# Patient Record
Sex: FEMALE | Race: WHITE | NOT HISPANIC OR LATINO
[De-identification: names, ages, dates, MRNs, and addresses within clinical notes are randomized per-mention and may not be internally consistent; named-entity substitution may affect disease eponyms.]

---

## 2021-04-13 ENCOUNTER — APPOINTMENT (OUTPATIENT)
Dept: ORTHOPEDIC SURGERY | Facility: CLINIC | Age: 51
End: 2021-04-13
Payer: COMMERCIAL

## 2021-04-13 DIAGNOSIS — M75.81 OTHER SHOULDER LESIONS, RIGHT SHOULDER: ICD-10-CM

## 2021-04-13 DIAGNOSIS — M25.511 PAIN IN RIGHT SHOULDER: ICD-10-CM

## 2021-04-13 PROCEDURE — 73030 X-RAY EXAM OF SHOULDER: CPT | Mod: RT

## 2021-04-13 PROCEDURE — 99203 OFFICE O/P NEW LOW 30 MIN: CPT

## 2021-04-13 PROCEDURE — 99072 ADDL SUPL MATRL&STAF TM PHE: CPT

## 2021-04-13 RX ORDER — DIPHENHYDRAMINE HCL, IBUPROFEN 25; 200 MG/1; MG/1
200-25 CAPSULE, LIQUID FILLED ORAL
Refills: 0 | Status: ACTIVE | COMMUNITY

## 2021-04-13 NOTE — ADDENDUM
[FreeTextEntry1] : I, Pawan Arriaza wrote this note acting as a scribe for Dr. Nick Godoy on Apr 13, 2021.

## 2021-04-13 NOTE — HISTORY OF PRESENT ILLNESS
[de-identified] : PIO TRAN is a 50 year female who presents for initial evaluation of right shoulder pain. Patient threw a toy a few weeks ago which caused immediate pain. Now pain is present with range of motion. Patient states pain is worse at night. There is no numbness present. Patient takes Advil prn for pain.

## 2021-04-13 NOTE — END OF VISIT
[FreeTextEntry3] : All medical record entries made by the Scribe were at my,  Dr. Nick Godoy MD., direction and personally dictated by me on 04/13/2021. I have personally reviewed the chart and agree that the record accurately reflects my personal performance of the history, physical exam, assessment and plan.\par \par \par

## 2021-04-13 NOTE — PHYSICAL EXAM
[de-identified] : Patient is WDWN, alert, and in no acute distress. Breathing is unlabored. She is grossly oriented to person, place, and time\par \par Right Shoulder:\par   Inspection/ Palpation: there is moderate deltoid tenderness, no discoloration, swelling, or deformities\par . Range of Motion: active flexion, passive flexion, abduction, external rotation is full but with pain\par Strength: forward elevation, internal rotation, external rotation, adduction, and abduction are 5/5. \par  Stability: no joint instability on provocative testing.  [de-identified] : AP, transcapula, and axillary views of the [] shoulder were obtained and revealed no abnormalities. No acute fracture. No dislocation. Cartilage spaces are maintained.\par

## 2021-04-13 NOTE — DISCUSSION/SUMMARY
[de-identified] : The underlying pathophysiology was reviewed with the patient. XR films were reviewed with the patient. Discussed at length the nature of the patient’s condition. 		\par \par Treatment options were discussed including; observation, NSAIDS, analgesics, bracing, injection(s) and physical therapy.\par \par The patient wishes to proceed with physical therapy for right shoulder. A script was given.\par \par Advised use of heating pad on shoulder.		\par Advil or Aleve prn\par Advised gentle ROM and to keep shoulder active\par \par Patient can continue activities as tolerated. All questions answered, understanding verbalized. Patient in agreement with plan of care. Patient may follow up as needed.\par

## 2021-10-27 ENCOUNTER — FORM ENCOUNTER (OUTPATIENT)
Age: 51
End: 2021-10-27

## 2021-10-28 ENCOUNTER — TRANSCRIPTION ENCOUNTER (OUTPATIENT)
Age: 51
End: 2021-10-28

## 2022-04-02 ENCOUNTER — TRANSCRIPTION ENCOUNTER (OUTPATIENT)
Age: 52
End: 2022-04-02

## 2022-04-06 ENCOUNTER — TRANSCRIPTION ENCOUNTER (OUTPATIENT)
Age: 52
End: 2022-04-06

## 2022-06-08 ENCOUNTER — NON-APPOINTMENT (OUTPATIENT)
Age: 52
End: 2022-06-08

## 2022-06-09 ENCOUNTER — INPATIENT (INPATIENT)
Facility: HOSPITAL | Age: 52
LOS: 2 days | Discharge: HOME | DRG: 282 | End: 2022-06-12
Attending: STUDENT IN AN ORGANIZED HEALTH CARE EDUCATION/TRAINING PROGRAM | Admitting: STUDENT IN AN ORGANIZED HEALTH CARE EDUCATION/TRAINING PROGRAM
Payer: COMMERCIAL

## 2022-06-09 ENCOUNTER — EMERGENCY (EMERGENCY)
Facility: HOSPITAL | Age: 52
LOS: 1 days | Discharge: ANOTHER HOSPITAL | End: 2022-06-09
Attending: EMERGENCY MEDICINE | Admitting: EMERGENCY MEDICINE
Payer: COMMERCIAL

## 2022-06-09 VITALS
HEART RATE: 69 BPM | TEMPERATURE: 98 F | SYSTOLIC BLOOD PRESSURE: 108 MMHG | OXYGEN SATURATION: 98 % | RESPIRATION RATE: 18 BRPM | DIASTOLIC BLOOD PRESSURE: 87 MMHG

## 2022-06-09 VITALS
RESPIRATION RATE: 18 BRPM | OXYGEN SATURATION: 97 % | WEIGHT: 153 LBS | HEART RATE: 81 BPM | DIASTOLIC BLOOD PRESSURE: 78 MMHG | TEMPERATURE: 98 F | HEIGHT: 64.5 IN | SYSTOLIC BLOOD PRESSURE: 128 MMHG

## 2022-06-09 VITALS
OXYGEN SATURATION: 100 % | HEART RATE: 71 BPM | RESPIRATION RATE: 19 BRPM | WEIGHT: 154.1 LBS | SYSTOLIC BLOOD PRESSURE: 147 MMHG | DIASTOLIC BLOOD PRESSURE: 94 MMHG | TEMPERATURE: 98 F

## 2022-06-09 DIAGNOSIS — Z29.9 ENCOUNTER FOR PROPHYLACTIC MEASURES, UNSPECIFIED: ICD-10-CM

## 2022-06-09 DIAGNOSIS — R07.9 CHEST PAIN, UNSPECIFIED: ICD-10-CM

## 2022-06-09 DIAGNOSIS — I21.4 NON-ST ELEVATION (NSTEMI) MYOCARDIAL INFARCTION: ICD-10-CM

## 2022-06-09 LAB
ALBUMIN SERPL ELPH-MCNC: 3.7 G/DL — SIGNIFICANT CHANGE UP (ref 3.3–5)
ALBUMIN SERPL ELPH-MCNC: 4 G/DL — SIGNIFICANT CHANGE UP (ref 3.3–5)
ALP SERPL-CCNC: 46 U/L — SIGNIFICANT CHANGE UP (ref 40–120)
ALP SERPL-CCNC: 51 U/L — SIGNIFICANT CHANGE UP (ref 40–120)
ALT FLD-CCNC: 13 U/L — SIGNIFICANT CHANGE UP (ref 10–45)
ALT FLD-CCNC: 18 U/L — SIGNIFICANT CHANGE UP (ref 10–45)
ANION GAP SERPL CALC-SCNC: 10 MMOL/L — SIGNIFICANT CHANGE UP (ref 5–17)
ANION GAP SERPL CALC-SCNC: 7 MMOL/L — SIGNIFICANT CHANGE UP (ref 5–17)
APTT BLD: 28.5 SEC — SIGNIFICANT CHANGE UP (ref 27.5–35.5)
APTT BLD: 64.3 SEC — HIGH (ref 27.5–35.5)
APTT BLD: 99.8 SEC — HIGH (ref 27.5–35.5)
AST SERPL-CCNC: 33 U/L — SIGNIFICANT CHANGE UP (ref 10–40)
AST SERPL-CCNC: 40 U/L — SIGNIFICANT CHANGE UP (ref 10–40)
BASOPHILS # BLD AUTO: 0.05 K/UL — SIGNIFICANT CHANGE UP (ref 0–0.2)
BASOPHILS # BLD AUTO: 0.05 K/UL — SIGNIFICANT CHANGE UP (ref 0–0.2)
BASOPHILS # BLD AUTO: 0.07 K/UL — SIGNIFICANT CHANGE UP (ref 0–0.2)
BASOPHILS NFR BLD AUTO: 0.5 % — SIGNIFICANT CHANGE UP (ref 0–2)
BASOPHILS NFR BLD AUTO: 0.5 % — SIGNIFICANT CHANGE UP (ref 0–2)
BASOPHILS NFR BLD AUTO: 0.7 % — SIGNIFICANT CHANGE UP (ref 0–2)
BILIRUB SERPL-MCNC: 0.2 MG/DL — SIGNIFICANT CHANGE UP (ref 0.2–1.2)
BILIRUB SERPL-MCNC: 0.3 MG/DL — SIGNIFICANT CHANGE UP (ref 0.2–1.2)
BUN SERPL-MCNC: 12 MG/DL — SIGNIFICANT CHANGE UP (ref 7–23)
BUN SERPL-MCNC: 14 MG/DL — SIGNIFICANT CHANGE UP (ref 7–23)
CALCIUM SERPL-MCNC: 8.8 MG/DL — SIGNIFICANT CHANGE UP (ref 8.4–10.5)
CALCIUM SERPL-MCNC: 9.1 MG/DL — SIGNIFICANT CHANGE UP (ref 8.4–10.5)
CHLORIDE SERPL-SCNC: 106 MMOL/L — SIGNIFICANT CHANGE UP (ref 96–108)
CHLORIDE SERPL-SCNC: 107 MMOL/L — SIGNIFICANT CHANGE UP (ref 96–108)
CK MB CFR SERPL CALC: 20.2 NG/ML — HIGH (ref 0–3.8)
CO2 SERPL-SCNC: 24 MMOL/L — SIGNIFICANT CHANGE UP (ref 22–31)
CO2 SERPL-SCNC: 29 MMOL/L — SIGNIFICANT CHANGE UP (ref 22–31)
CREAT SERPL-MCNC: 0.65 MG/DL — SIGNIFICANT CHANGE UP (ref 0.5–1.3)
CREAT SERPL-MCNC: 0.7 MG/DL — SIGNIFICANT CHANGE UP (ref 0.5–1.3)
EGFR: 104 ML/MIN/1.73M2 — SIGNIFICANT CHANGE UP
EGFR: 106 ML/MIN/1.73M2 — SIGNIFICANT CHANGE UP
EOSINOPHIL # BLD AUTO: 0.11 K/UL — SIGNIFICANT CHANGE UP (ref 0–0.5)
EOSINOPHIL # BLD AUTO: 0.17 K/UL — SIGNIFICANT CHANGE UP (ref 0–0.5)
EOSINOPHIL # BLD AUTO: 0.17 K/UL — SIGNIFICANT CHANGE UP (ref 0–0.5)
EOSINOPHIL NFR BLD AUTO: 1.2 % — SIGNIFICANT CHANGE UP (ref 0–6)
EOSINOPHIL NFR BLD AUTO: 1.6 % — SIGNIFICANT CHANGE UP (ref 0–6)
EOSINOPHIL NFR BLD AUTO: 1.9 % — SIGNIFICANT CHANGE UP (ref 0–6)
GLUCOSE SERPL-MCNC: 93 MG/DL — SIGNIFICANT CHANGE UP (ref 70–99)
GLUCOSE SERPL-MCNC: 97 MG/DL — SIGNIFICANT CHANGE UP (ref 70–99)
HCT VFR BLD CALC: 35.1 % — SIGNIFICANT CHANGE UP (ref 34.5–45)
HCT VFR BLD CALC: 36.3 % — SIGNIFICANT CHANGE UP (ref 34.5–45)
HCT VFR BLD CALC: 36.7 % — SIGNIFICANT CHANGE UP (ref 34.5–45)
HGB BLD-MCNC: 11.7 G/DL — SIGNIFICANT CHANGE UP (ref 11.5–15.5)
HGB BLD-MCNC: 11.9 G/DL — SIGNIFICANT CHANGE UP (ref 11.5–15.5)
HGB BLD-MCNC: 12.5 G/DL — SIGNIFICANT CHANGE UP (ref 11.5–15.5)
IMM GRANULOCYTES NFR BLD AUTO: 0.2 % — SIGNIFICANT CHANGE UP (ref 0–1.5)
IMM GRANULOCYTES NFR BLD AUTO: 0.3 % — SIGNIFICANT CHANGE UP (ref 0–1.5)
IMM GRANULOCYTES NFR BLD AUTO: 0.3 % — SIGNIFICANT CHANGE UP (ref 0–1.5)
INR BLD: 1.2 RATIO — HIGH (ref 0.88–1.16)
INR BLD: 1.22 RATIO — HIGH (ref 0.88–1.16)
INR BLD: 1.27 RATIO — HIGH (ref 0.88–1.16)
LYMPHOCYTES # BLD AUTO: 2.25 K/UL — SIGNIFICANT CHANGE UP (ref 1–3.3)
LYMPHOCYTES # BLD AUTO: 2.6 K/UL — SIGNIFICANT CHANGE UP (ref 1–3.3)
LYMPHOCYTES # BLD AUTO: 23.7 % — SIGNIFICANT CHANGE UP (ref 13–44)
LYMPHOCYTES # BLD AUTO: 28.4 % — SIGNIFICANT CHANGE UP (ref 13–44)
LYMPHOCYTES # BLD AUTO: 3.57 K/UL — HIGH (ref 1–3.3)
LYMPHOCYTES # BLD AUTO: 33.6 % — SIGNIFICANT CHANGE UP (ref 13–44)
MCHC RBC-ENTMCNC: 29 PG — SIGNIFICANT CHANGE UP (ref 27–34)
MCHC RBC-ENTMCNC: 30.4 PG — SIGNIFICANT CHANGE UP (ref 27–34)
MCHC RBC-ENTMCNC: 30.5 PG — SIGNIFICANT CHANGE UP (ref 27–34)
MCHC RBC-ENTMCNC: 32.2 GM/DL — SIGNIFICANT CHANGE UP (ref 32–36)
MCHC RBC-ENTMCNC: 33.9 GM/DL — SIGNIFICANT CHANGE UP (ref 32–36)
MCHC RBC-ENTMCNC: 34.1 GM/DL — SIGNIFICANT CHANGE UP (ref 32–36)
MCV RBC AUTO: 89.5 FL — SIGNIFICANT CHANGE UP (ref 80–100)
MCV RBC AUTO: 89.5 FL — SIGNIFICANT CHANGE UP (ref 80–100)
MCV RBC AUTO: 89.9 FL — SIGNIFICANT CHANGE UP (ref 80–100)
MONOCYTES # BLD AUTO: 0.6 K/UL — SIGNIFICANT CHANGE UP (ref 0–0.9)
MONOCYTES # BLD AUTO: 0.62 K/UL — SIGNIFICANT CHANGE UP (ref 0–0.9)
MONOCYTES # BLD AUTO: 0.62 K/UL — SIGNIFICANT CHANGE UP (ref 0–0.9)
MONOCYTES NFR BLD AUTO: 5.8 % — SIGNIFICANT CHANGE UP (ref 2–14)
MONOCYTES NFR BLD AUTO: 6.3 % — SIGNIFICANT CHANGE UP (ref 2–14)
MONOCYTES NFR BLD AUTO: 6.8 % — SIGNIFICANT CHANGE UP (ref 2–14)
NEUTROPHILS # BLD AUTO: 5.71 K/UL — SIGNIFICANT CHANGE UP (ref 1.8–7.4)
NEUTROPHILS # BLD AUTO: 6.18 K/UL — SIGNIFICANT CHANGE UP (ref 1.8–7.4)
NEUTROPHILS # BLD AUTO: 6.45 K/UL — SIGNIFICANT CHANGE UP (ref 1.8–7.4)
NEUTROPHILS NFR BLD AUTO: 58 % — SIGNIFICANT CHANGE UP (ref 43–77)
NEUTROPHILS NFR BLD AUTO: 62.2 % — SIGNIFICANT CHANGE UP (ref 43–77)
NEUTROPHILS NFR BLD AUTO: 68 % — SIGNIFICANT CHANGE UP (ref 43–77)
NRBC # BLD: 0 /100 WBCS — SIGNIFICANT CHANGE UP (ref 0–0)
NT-PROBNP SERPL-SCNC: 198 PG/ML — SIGNIFICANT CHANGE UP (ref 0–300)
PLATELET # BLD AUTO: 227 K/UL — SIGNIFICANT CHANGE UP (ref 150–400)
PLATELET # BLD AUTO: 244 K/UL — SIGNIFICANT CHANGE UP (ref 150–400)
PLATELET # BLD AUTO: 245 K/UL — SIGNIFICANT CHANGE UP (ref 150–400)
POTASSIUM SERPL-MCNC: 3.3 MMOL/L — LOW (ref 3.5–5.3)
POTASSIUM SERPL-MCNC: 3.5 MMOL/L — SIGNIFICANT CHANGE UP (ref 3.5–5.3)
POTASSIUM SERPL-SCNC: 3.3 MMOL/L — LOW (ref 3.5–5.3)
POTASSIUM SERPL-SCNC: 3.5 MMOL/L — SIGNIFICANT CHANGE UP (ref 3.5–5.3)
PROT SERPL-MCNC: 6.7 G/DL — SIGNIFICANT CHANGE UP (ref 6–8.3)
PROT SERPL-MCNC: 7.2 G/DL — SIGNIFICANT CHANGE UP (ref 6–8.3)
PROTHROM AB SERPL-ACNC: 13.9 SEC — HIGH (ref 10.5–13.4)
PROTHROM AB SERPL-ACNC: 14.2 SEC — HIGH (ref 10.5–13.4)
PROTHROM AB SERPL-ACNC: 14.8 SEC — HIGH (ref 10.5–13.4)
RBC # BLD: 3.92 M/UL — SIGNIFICANT CHANGE UP (ref 3.8–5.2)
RBC # BLD: 4.04 M/UL — SIGNIFICANT CHANGE UP (ref 3.8–5.2)
RBC # BLD: 4.1 M/UL — SIGNIFICANT CHANGE UP (ref 3.8–5.2)
RBC # FLD: 12.5 % — SIGNIFICANT CHANGE UP (ref 10.3–14.5)
RBC # FLD: 12.6 % — SIGNIFICANT CHANGE UP (ref 10.3–14.5)
RBC # FLD: 12.7 % — SIGNIFICANT CHANGE UP (ref 10.3–14.5)
SARS-COV-2 RNA SPEC QL NAA+PROBE: SIGNIFICANT CHANGE UP
SODIUM SERPL-SCNC: 141 MMOL/L — SIGNIFICANT CHANGE UP (ref 135–145)
SODIUM SERPL-SCNC: 142 MMOL/L — SIGNIFICANT CHANGE UP (ref 135–145)
TROPONIN I, HIGH SENSITIVITY RESULT: HIGH NG/L
TROPONIN T, HIGH SENSITIVITY RESULT: 866 NG/L — HIGH (ref 0–51)
WBC # BLD: 10.64 K/UL — HIGH (ref 3.8–10.5)
WBC # BLD: 9.17 K/UL — SIGNIFICANT CHANGE UP (ref 3.8–10.5)
WBC # BLD: 9.49 K/UL — SIGNIFICANT CHANGE UP (ref 3.8–10.5)
WBC # FLD AUTO: 10.64 K/UL — HIGH (ref 3.8–10.5)
WBC # FLD AUTO: 9.17 K/UL — SIGNIFICANT CHANGE UP (ref 3.8–10.5)
WBC # FLD AUTO: 9.49 K/UL — SIGNIFICANT CHANGE UP (ref 3.8–10.5)

## 2022-06-09 PROCEDURE — 84484 ASSAY OF TROPONIN QUANT: CPT

## 2022-06-09 PROCEDURE — 93010 ELECTROCARDIOGRAM REPORT: CPT

## 2022-06-09 PROCEDURE — 85025 COMPLETE CBC W/AUTO DIFF WBC: CPT

## 2022-06-09 PROCEDURE — 87635 SARS-COV-2 COVID-19 AMP PRB: CPT

## 2022-06-09 PROCEDURE — 99285 EMERGENCY DEPT VISIT HI MDM: CPT

## 2022-06-09 PROCEDURE — 80053 COMPREHEN METABOLIC PANEL: CPT

## 2022-06-09 PROCEDURE — 71045 X-RAY EXAM CHEST 1 VIEW: CPT | Mod: 26

## 2022-06-09 PROCEDURE — 99291 CRITICAL CARE FIRST HOUR: CPT

## 2022-06-09 PROCEDURE — 99285 EMERGENCY DEPT VISIT HI MDM: CPT | Mod: 25

## 2022-06-09 PROCEDURE — 93010 ELECTROCARDIOGRAM REPORT: CPT | Mod: 77

## 2022-06-09 PROCEDURE — 99223 1ST HOSP IP/OBS HIGH 75: CPT

## 2022-06-09 PROCEDURE — 85730 THROMBOPLASTIN TIME PARTIAL: CPT

## 2022-06-09 PROCEDURE — 71045 X-RAY EXAM CHEST 1 VIEW: CPT

## 2022-06-09 PROCEDURE — 93005 ELECTROCARDIOGRAM TRACING: CPT

## 2022-06-09 PROCEDURE — 36415 COLL VENOUS BLD VENIPUNCTURE: CPT

## 2022-06-09 PROCEDURE — 83880 ASSAY OF NATRIURETIC PEPTIDE: CPT

## 2022-06-09 PROCEDURE — 85610 PROTHROMBIN TIME: CPT

## 2022-06-09 PROCEDURE — 96374 THER/PROPH/DIAG INJ IV PUSH: CPT

## 2022-06-09 RX ORDER — HEPARIN SODIUM 5000 [USP'U]/ML
INJECTION INTRAVENOUS; SUBCUTANEOUS
Qty: 25000 | Refills: 0 | Status: DISCONTINUED | OUTPATIENT
Start: 2022-06-09 | End: 2022-06-10

## 2022-06-09 RX ORDER — ASPIRIN/CALCIUM CARB/MAGNESIUM 324 MG
81 TABLET ORAL DAILY
Refills: 0 | Status: DISCONTINUED | OUTPATIENT
Start: 2022-06-09 | End: 2022-06-11

## 2022-06-09 RX ORDER — ATORVASTATIN CALCIUM 80 MG/1
40 TABLET, FILM COATED ORAL AT BEDTIME
Refills: 0 | Status: DISCONTINUED | OUTPATIENT
Start: 2022-06-09 | End: 2022-06-12

## 2022-06-09 RX ORDER — HEPARIN SODIUM 5000 [USP'U]/ML
4100 INJECTION INTRAVENOUS; SUBCUTANEOUS ONCE
Refills: 0 | Status: COMPLETED | OUTPATIENT
Start: 2022-06-09 | End: 2022-06-09

## 2022-06-09 RX ORDER — POTASSIUM CHLORIDE 20 MEQ
40 PACKET (EA) ORAL ONCE
Refills: 0 | Status: COMPLETED | OUTPATIENT
Start: 2022-06-09 | End: 2022-06-10

## 2022-06-09 RX ORDER — HEPARIN SODIUM 5000 [USP'U]/ML
INJECTION INTRAVENOUS; SUBCUTANEOUS
Qty: 25000 | Refills: 0 | Status: DISCONTINUED | OUTPATIENT
Start: 2022-06-09 | End: 2022-06-09

## 2022-06-09 RX ORDER — HEPARIN SODIUM 5000 [USP'U]/ML
INJECTION INTRAVENOUS; SUBCUTANEOUS
Qty: 25000 | Refills: 0 | Status: DISCONTINUED | OUTPATIENT
Start: 2022-06-09 | End: 2022-06-13

## 2022-06-09 RX ORDER — ACETAMINOPHEN 500 MG
650 TABLET ORAL EVERY 6 HOURS
Refills: 0 | Status: DISCONTINUED | OUTPATIENT
Start: 2022-06-09 | End: 2022-06-12

## 2022-06-09 RX ORDER — HEPARIN SODIUM 5000 [USP'U]/ML
4100 INJECTION INTRAVENOUS; SUBCUTANEOUS EVERY 6 HOURS
Refills: 0 | Status: DISCONTINUED | OUTPATIENT
Start: 2022-06-09 | End: 2022-06-10

## 2022-06-09 RX ORDER — METOPROLOL TARTRATE 50 MG
25 TABLET ORAL ONCE
Refills: 0 | Status: COMPLETED | OUTPATIENT
Start: 2022-06-09 | End: 2022-06-09

## 2022-06-09 RX ORDER — CLOPIDOGREL BISULFATE 75 MG/1
300 TABLET, FILM COATED ORAL ONCE
Refills: 0 | Status: COMPLETED | OUTPATIENT
Start: 2022-06-09 | End: 2022-06-09

## 2022-06-09 RX ORDER — LANOLIN ALCOHOL/MO/W.PET/CERES
3 CREAM (GRAM) TOPICAL AT BEDTIME
Refills: 0 | Status: DISCONTINUED | OUTPATIENT
Start: 2022-06-09 | End: 2022-06-12

## 2022-06-09 RX ORDER — ASPIRIN/CALCIUM CARB/MAGNESIUM 324 MG
324 TABLET ORAL ONCE
Refills: 0 | Status: COMPLETED | OUTPATIENT
Start: 2022-06-09 | End: 2022-06-09

## 2022-06-09 RX ORDER — ONDANSETRON 8 MG/1
4 TABLET, FILM COATED ORAL EVERY 8 HOURS
Refills: 0 | Status: DISCONTINUED | OUTPATIENT
Start: 2022-06-09 | End: 2022-06-12

## 2022-06-09 RX ORDER — NITROGLYCERIN 6.5 MG
0.4 CAPSULE, EXTENDED RELEASE ORAL
Refills: 0 | Status: DISCONTINUED | OUTPATIENT
Start: 2022-06-09 | End: 2022-06-12

## 2022-06-09 RX ORDER — HEPARIN SODIUM 5000 [USP'U]/ML
4100 INJECTION INTRAVENOUS; SUBCUTANEOUS EVERY 6 HOURS
Refills: 0 | Status: DISCONTINUED | OUTPATIENT
Start: 2022-06-09 | End: 2022-06-09

## 2022-06-09 RX ADMIN — HEPARIN SODIUM 800 UNIT(S)/HR: 5000 INJECTION INTRAVENOUS; SUBCUTANEOUS at 16:16

## 2022-06-09 RX ADMIN — CLOPIDOGREL BISULFATE 300 MILLIGRAM(S): 75 TABLET, FILM COATED ORAL at 16:08

## 2022-06-09 RX ADMIN — HEPARIN SODIUM 4100 UNIT(S): 5000 INJECTION INTRAVENOUS; SUBCUTANEOUS at 16:14

## 2022-06-09 RX ADMIN — Medication 25 MILLIGRAM(S): at 16:28

## 2022-06-09 RX ADMIN — Medication 324 MILLIGRAM(S): at 16:07

## 2022-06-09 RX ADMIN — HEPARIN SODIUM 800 UNIT(S)/HR: 5000 INJECTION INTRAVENOUS; SUBCUTANEOUS at 19:28

## 2022-06-09 RX ADMIN — HEPARIN SODIUM 800 UNIT(S)/HR: 5000 INJECTION INTRAVENOUS; SUBCUTANEOUS at 22:17

## 2022-06-09 NOTE — ED PROVIDER NOTE - CLINICAL SUMMARY MEDICAL DECISION MAKING FREE TEXT BOX
Manjinder-PGY3: Pt is a 52 F with no PMH, non-smoker transferred from [facility] after initially presenting with chest pain. Patient says woken up with substernal chest pain early AM associated with nausea + 2x emesis that resolved after 2 hours. Denies any chest pain since then. Denies any SOB, palpitations, fevers/chills, weakness/dizziness, diarrhea, bloody stools, tarry stools, headache. Pt states never had any similar symptoms before. No CP since initial episode, no complaints at this time. Pt with NSTEMI, will obtain labs, cards consult/recs, anticipate likely admission for further evaluation/management.

## 2022-06-09 NOTE — ED PROVIDER NOTE - CONSULTANT FREE TEXT FOR MDM DISCUSSED CASE WITH QUESTION
Dr. Diaz evaluated pt in the ED and reccd transfer to [facility], He spoke with cardiologist Dr. Rivas at [facility], pt was accepted for transfer

## 2022-06-09 NOTE — ED PROVIDER NOTE - NS ED ROS FT
Review of Systems    Constitutional: (-) fever, (-) chills, (-) fatigue  HEENT: (-) sore throat, (-) hearing loss, (-) nasal congestion  Cardiovascular: (+) chest pain [resolved], (-) syncope  Respiratory: (-) cough, (-) shortness of breath  Gastrointestinal: (-) vomiting, (-) diarrhea, (-) abdominal pain  Musculoskeletal: (-) neck pain, (-) back pain, (-) joint pain  Integumentary: (-) rash, (-) edema, (-) wound  Neurological: (-) headache, (-) altered mental status    Except as documented in the HPI, all other systems are negative.

## 2022-06-09 NOTE — ED ADULT NURSE REASSESSMENT NOTE - NS ED NURSE REASSESS COMMENT FT1
pt in no obvious distress, no symptoms at this time. pt receiving heparin per MD order. awaiting transfer.

## 2022-06-09 NOTE — CONSULT NOTE ADULT - SUBJECTIVE AND OBJECTIVE BOX
Patient seen and evaluated at bedside    Chief Complaint:    HPI:  51 yo F w/ no PMH who presents with chest pain. Woke up with midsternal chest pain, non radiating and also felt nauseous. 1x NBNB emesis. Symptoms lasted for 2-3 hours and then went away on its own. Went to urgent care and was told to come into [facility]. At [facility], troponin was 10,000s. Given aspirin, plavix, heparin and metoprolol. Currently patient asymptomatic. Denied having these symptoms in the past. No chest pain on exertion before. Denies SOB, ligthheadedness, dizziness, NV, diaphoresis. Takes no meds. Drinks 1 glass of wine a day, denies smoking, drug use. Mother had HF and grand mother had CAD.     In the ED, VSS.     EKG shows sinus rhythm, non-ischemic     PMHx:       PSHx:       Allergies:  Bactrim (Unknown)      Home Meds:    Current Medications:   heparin   Injectable 4100 Unit(s) IV Push every 6 hours PRN  heparin  Infusion.  Unit(s)/Hr IV Continuous <Continuous>      FAMILY HISTORY:      Social History:  Smoking History:  Alcohol Use:  Drug Use:    REVIEW OF SYSTEMS:  Constitutional:     [x ] negative [ ] fevers [ ] chills [ ] weight loss [ ] weight gain  HEENT:                  [x ] negative [ ] dry eyes [ ] eye irritation [ ] postnasal drip [ ] nasal congestion  CV:                         [ x] negative  [ ] chest pain [ ] orthopnea [ ] palpitations [ ] murmur  Resp:                     [x ] negative [ ] cough [ ] shortness of breath [ ] dyspnea [ ] wheezing [ ] sputum [ ]hemoptysis  GI:                          [ x] negative [ ] nausea [ ] vomiting [ ] diarrhea [ ] constipation [ ] abd pain [ ] dysphagia   :                        [ x] negative [ ] dysuria [ ] nocturia [ ] hematuria [ ] increased urinary frequency  Musculoskeletal: [x ] negative [ ] back pain [ ] myalgias [ ] arthralgias [ ] fracture  Skin:                       [ x] negative [ ] rash [ ] itch  Neurological:        [ x] negative [ ] headache [ ] dizziness [ ] syncope [ ] weakness [ ] numbness  Psychiatric:           [ x] negative [ ] anxiety [ ] depression  Endocrine:            [ x] negative [ ] diabetes [ ] thyroid problem  Heme/Lymph:      [ x] negative [ ] anemia [ ] bleeding problem  Allergic/Immune: [ x] negative [ ] itchy eyes [ ] nasal discharge [ ] hives [ ] angioedema    [ x] All other systems negative  [ ] Unable to assess ROS due to      Physical Exam:  T(F): 98.1 (06-09), Max: 98.1 (06-09)  HR: 71 (06-09) (71 - 71)  BP: 147/94 (06-09) (147/94 - 147/94)  RR: 19 (06-09)  SpO2: 100% (06-09)  GENERAL: No acute distress, well-developed  CHEST/LUNG: Clear to auscultation bilaterally; No wheeze, equal breath sounds bilaterally   HEART: Regular rate and rhythm; No murmurs, rubs, or gallops  EXTREMITIES:  No clubbing, cyanosis, or edema  PSYCH: Nl behavior, nl affect  NEUROLOGY: AAOx3, non-focal   SKIN: Normal color, No rashes or lesions  LINES:    Cardiovascular Diagnostic Testing:    ECG: Personally reviewed:    Echo: Personally reviewed:    Stress Testing:    Cath:    Imaging:    CXR: Personally reviewed    Labs: Personally reviewed                        11.7   10.64 )-----------( 227      ( 09 Jun 2022 19:15 )             36.3           PT/INR - ( 09 Jun 2022 19:15 )   PT: 14.8 sec;   INR: 1.27 ratio         PTT - ( 09 Jun 2022 19:15 )  PTT:99.8 sec

## 2022-06-09 NOTE — ED PROVIDER NOTE - NS ED ATTENDING STATEMENT MOD
This was a shared visit with the BRANDO. I reviewed and verified the documentation and independently performed the documented:

## 2022-06-09 NOTE — ED ADULT NURSE NOTE - OBJECTIVE STATEMENT
Pt Pt is a 52y F transfer from . Pt states she woke up w/ chest pain/palpitations around 5/6am, endorsed nausea and vomiting x2. Per EMS pt had elevated trop at , Pt brought in on heparin drip started at 16:16 @ 800units/hr. Pt denies chest pain, sob, nausea. Pt denies fever, chills, cough, dizziness, weakness, abd pain. Pt A&Ox3, breathing unlabored and spontaneous, neuro status intact, abd soft nontender nondistended. Pt resting in stretcher, placed on cardiac monitor, bed in lowest position, family member at bedside, aware of plan of care. Comfort and safety measures maintained.

## 2022-06-09 NOTE — ED PROVIDER NOTE - PHYSICAL EXAMINATION
CONSTITUTIONAL: non-toxic, well appearing  SKIN: no rash, no petechiae.  EYES: PERRL, EOMI, pink conjunctiva, anicteric  NECK: Supple; no meningismus, no JVD  CARD: RRR, no murmurs, equal radial pulses bilaterally 2+  RESP: CTAB, no respiratory distress  ABD: Soft, non-tender, non-distended, no peritoneal signs, no CVA tenderness  EXT: Normal ROM x4. No edema. No calf tenderness  NEURO: Alert, oriented. Neuro exam nonfocal, equal strength bilaterally

## 2022-06-09 NOTE — ED PROVIDER NOTE - OBJECTIVE STATEMENT
53 y/o F with no reported PMH presents to the ED for 7/10 sharp midsternal non-radiating chest pain at 3am this morning, woke her up from sleep, lasted ~2.5 hours then subsided, a/w with 2 episodes of NBNB emesis. Pt denies n/v, f/c, SOB, prolonged immobilization, calf pain, ankle swelling or all other complaints. Pt denies prior cardiac w/u

## 2022-06-09 NOTE — ED ADULT NURSE NOTE - OBJECTIVE STATEMENT
Assumed pt care for a 52 yr old female alert and oriented x3, complaining of sharp chest pain since last night. Pt denies any current pain. Reports pain was all of a sudden and awoke her from her sleep. Pain was sharp and substernal. Non radiant. 9 of 10 at 3am. Self medicated with TUMS. Reports no relief. Pt went to the urgent care center and they referred her to the ED. Pt denies any PMHx and medications. Denies any recent trauma. Denies any sick contact. ECG complete. IV established. Labs and COVID swab collected. Pending further disposition.

## 2022-06-09 NOTE — ED ADULT NURSE REASSESSMENT NOTE - NS ED NURSE REASSESS COMMENT FT1
plan for transfer. Dr. Diaz at bedside explaining plan. Pt on bedside monitor will continue to monitor.

## 2022-06-09 NOTE — CONSULT NOTE ADULT - SUBJECTIVE AND OBJECTIVE BOX
CHIEF COMPLAINT:  Patient is a 52y old  Female who presents with a chief complaint of chest pain.   HPI:  Generally healthy premenopausal lady awoke with chest pain, non radiating, early this am. No sob, diaphoresis.   Feels much better now, mild residual central chest discomfort. Has no history of heart disease, MI, HBP, DM, elevated Lipids.  Tn markedly elevated and consult requested.      PMH:   No pertinent past medical history        PSH:  no    FAMILY HISTORY:  non contributory      SOCIAL HISTORY:  Smoking:  no  Alcohol:   no  Drugs:    ALLERGIES:  sulfa drugs (Rash)      Home Medications:  none      MEDICATIONS:  heparin  Infusion.  Unit(s)/Hr IV Continuous <Continuous>      REVIEW OF SYSTEMS:  CONSTITUTIONAL: No fever, weight loss, or fatigue  EYES: No eye pain, visual disturbances, or discharge  ENMT:  No difficulty hearing, tinnitus, vertigo; No sinus or throat pain  NECK: No pain or stiffness  BREASTS: No pain, masses, or nipple discharge  RESPIRATORY: No cough, wheezing, chills or hemoptysis; No shortness of breath  CARDIOVASCULAR: No  palpitations, dizziness, or leg swelling  GASTROINTESTINAL: No abdominal or epigastric pain. No nausea, vomiting, or hematemesis; No diarrhea or constipation. No melena or hematochezia.  GENITOURINARY: No dysuria, frequency, hematuria, or incontinence  NEUROLOGICAL: No headaches, memory loss, loss of strength, numbness, or tremors  SKIN: No itching, burning, rashes, or lesions   LYMPH NODES: No enlarged glands  ENDOCRINE: No heat or cold intolerance; No hair loss  MUSCULOSKELETAL: No joint pain or swelling; No muscle, back, or extremity pain  PSYCHIATRIC: No depression, anxiety, mood swings, or difficulty sleeping  HEME/LYMPH: No easy bruising, or bleeding gums  ALLERGY AND IMMUNOLOGIC: No hives or eczema    PHYSICAL EXAM:  T(C): 36.9 (06-09-22 @ 14:30), Max: 36.9 (06-09-22 @ 14:30)  HR: 77 (06-09-22 @ 16:26) (77 - 81)  BP: 132/84 (06-09-22 @ 16:26) (128/78 - 132/84)  RR: 20 (06-09-22 @ 16:26) (18 - 20)  SpO2: 97% (06-09-22 @ 16:26) (97% - 97%)  Wt(kg): --    GENERAL: NAD, well-groomed, well-developed  HEAD:  Atraumatic, Normocephalic  EYES: EOMI, conjunctiva and sclera clear  ENT: Moist mucous membranes,  NECK: Supple, No JVD, no bruits  CHEST/LUNG: Clear to ausculation and percussion bilaterally; No rales, rhonchi, wheezing, or rubs  HEART: Regular rate and rhythm; No murmurs, rubs, or gallops PMI non displaced.  ABDOMEN: Soft, Nontender, Nondistended; Bowel sounds present  EXTREMITIES:  2+ Peripheral Pulses, No clubbing, cyanosis, or edema  SKIN: No rashes or lesions  NERVOUS SYSTEM:  Alert & Oriented X3, No focal deficits    Cardiovascular Diagnostic Testing:  ECG:  sinus, wnl    LABS:                        12.5   9.49  )-----------( 245      ( 09 Jun 2022 15:05 )             36.7     06-09    142  |  106  |  14  ----------------------------<  97  3.5   |  29  |  0.65    Ca    8.8      09 Jun 2022 15:05    TPro  7.2  /  Alb  3.7  /  TBili  0.3  /  DBili  x   /  AST  40  /  ALT  18  /  AlkPhos  51  06-09          Serum Pro-Brain Natriuretic Peptide: 198 pg/mL (06-09 @ 15:05)          Troponin I, High Sensitivity Result: 26869.1 ng/L (06-09-22 @ 15:05)      Telemetry:   sinus    IMAGING:   < from: Xray Chest 1 View- PORTABLE-Urgent (06.09.22 @ 14:53) >      INTERPRETATION:  INDICATION: Chest pain    COMPARISON: None    FINDINGS:  A portable upright view of the chest shows clear lungs bilaterally. No   infiltrates are seen. There is no pneumothorax. No pleural effusions are   present. There is no hilar or mediastinal widening. Heart size is normal,   without CHF. The bony thorax is grossly intact, however noting apparent   degenerative changes at the first right costochondral articulation.    IMPRESSION: Clear lungs with no acute cardiopulmonary abnormalities.   Suggestion of degenerative changes at the right first costochondral   articulation, however in the absence of any older studies for comparison,   consider an apical lordotic view to clear the right lung apex.    --- End of Report ---            JARETT ELI MD; Attending Radiologist  This document has been electronically signed. Jun 9 2022  2:56PM    < end of copied text >

## 2022-06-09 NOTE — CONSULT NOTE ADULT - ASSESSMENT
Imp:    Acute Non Stemi, hemodynamically stable.    D/w patient and ER staff.    Rx with aspirin 325  plavix 300mg  IV heparin  lopressor    check lipids, start statin such as lipitor    d/w Dr Rivas, interventional cardiology at [facility], patient for transfer for cath and further care    D/w Melody and Dr. Temple in ER

## 2022-06-09 NOTE — ED PROVIDER NOTE - OBJECTIVE STATEMENT
Pt is a 52 F with no PMH, non-smoker transferred from [facility] after initially presenting with chest pain. Patient says woken up with substernal chest pain early AM associated with nausea + 2x emesis that resolved after 2 hours. Denies any chest pain since then. Denies any SOB, palpitations, fevers/chills, weakness/dizziness, diarrhea, bloody stools, tarry stools, headache. Pt states never had any similar symptoms before.

## 2022-06-09 NOTE — ED PROVIDER NOTE - ATTENDING APP SHARED VISIT CONTRIBUTION OF CARE
I, Mine Temple MD, performed the initial face to face bedside interview with this patient regarding history of present illness, review of symptoms and relevant past medical, social and family history.  I completed an independent physical examination.  I was the initial provider who evaluated this patient. I have signed out the follow up of any pending tests (i.e. labs, radiological studies) to the ACP.  I have communicated the patient’s plan of care and disposition with the ACP.  The history, relevant review of systems, past medical and surgical history, medical decision making, and physical examination was documented by the scribe in my presence and I attest to the accuracy of the documentation.

## 2022-06-09 NOTE — H&P ADULT - NSICDXFAMILYHX_GEN_ALL_CORE_FT
FAMILY HISTORY:  Mother  Still living? Unknown  FHx: aortic stenosis, Age at diagnosis: Age Unknown    Grandparent  Still living? Unknown  FH: myocardial infarction, Age at diagnosis: Age Unknown

## 2022-06-09 NOTE — H&P ADULT - HISTORY OF PRESENT ILLNESS
52F no PMH p/w chest pain x 3-hours. She was awoken from sleep around 3AM with a sharp left-sided chest pain with no radiation. Pain was constant and 7/10 in intensity. States she felt nauseous and had 2-episodes of emesis mostly saliva.  Her partner had told her it was just heart-burn for which she took TUMS with no relief. She decided to go to the [facility] where she was advised to go to the hospital. She presented to  where she was found to have NSTEMI. She was loaded with DAPT and started on heparin gtt before being transferred to [facility] for cardiac cath. Denied dizziness, diaphoresis, SOB, abdominal pain, melena/hematochezia

## 2022-06-09 NOTE — ED ADULT NURSE REASSESSMENT NOTE - NS ED NURSE REASSESS COMMENT FT1
[facility] EMS transport at bedside. Pt being transferred o [facility]. Pt in stable condition and in no apparent distress.

## 2022-06-09 NOTE — H&P ADULT - PROBLEM SELECTOR PLAN 1
-trops on presentation elevated to 800s  -loaded ASA/plavix and started on heparin gtt at   -c/w Heparin gtt  -cont to trend trops till peak  -plan for cardiac cath [facility] per cards  -TTE ordered  -f/u lipid panel, A1C, TSH  -SL NTG prn for chest pain  -obtain stat trops, EKG if recurrent chest pain  -will start on lipitor 40mg qhs

## 2022-06-09 NOTE — ED PROVIDER NOTE - CLINICAL SUMMARY MEDICAL DECISION MAKING FREE TEXT BOX
51 y/o F with no reported PMH presents to the ED for 7/10 sharp midsternal non-radiating chest pain at 3am this morning, woke her up from sleep, lasted ~2.5 hours then subsided, a/w with 2 episodes of NBNB emesis. Pt denies n/v, f/c, SOB, prolonged immobilization, calf pain, ankle swelling or all other complaints. Pt denies prior cardiac w/u. PE as noted above. labs reviewed, trop was 10k. rpt EKG unchanged. Dr. Diaz saw pt in the ED-- see above reccds. pt given NSTEMI meds. Will transfer

## 2022-06-09 NOTE — ED ADULT NURSE NOTE - CAS ED TRANSFER FORM COMPLETE YN
Yes

## 2022-06-09 NOTE — H&P ADULT - NSHPPHYSICALEXAM_GEN_ALL_CORE
Vital Signs Last 24 Hrs  T(C): 36.5 (09 Jun 2022 23:26), Max: 36.7 (09 Jun 2022 18:50)  T(F): 97.7 (09 Jun 2022 23:26), Max: 98.1 (09 Jun 2022 18:50)  HR: 72 (09 Jun 2022 23:26) (69 - 72)  BP: 118/77 (09 Jun 2022 23:26) (118/77 - 147/94)  BP(mean): 109 (09 Jun 2022 18:50) (109 - 109)  RR: 18 (09 Jun 2022 23:26) (18 - 19)  SpO2: 98% (09 Jun 2022 23:26) (97% - 100%)

## 2022-06-09 NOTE — ED PROVIDER NOTE - PROGRESS NOTE DETAILS
Manjinder-PGY3: discussed with cards, plan for cath tomorrow. Discussed with hospitalist, accepted for admission.

## 2022-06-09 NOTE — H&P ADULT - NSHPREVIEWOFSYSTEMS_GEN_ALL_CORE
GEN: no night sweats or change in appetite  EYES: no changes in vision or diplopia   ENT: no epistaxis, sinus pain, gingival bleeding, odynophagia or dysphagia  CV: +CP,no PND or palpitations  RESP: no cough, wheezing, or hemoptysis  GI: no hematemesis, hematochezia, or melena  : no dysuria, polyuria, or hematuria  MSK: no arthralgias or joint swelling   NEURO: no gross sensory changes, numbness, focal deficits  PSYCH: no depression or changes in concentration  HEME/ONC: no purpura, petechiae or night sweats  SKIN: no pruritus, hair loss or skin lesions

## 2022-06-09 NOTE — ED ADULT NURSE REASSESSMENT NOTE - NS ED NURSE REASSESS COMMENT FT1
Pt transferred on heparin drip Pt transferred on heparin drip started at 16:16, repeat coags/cbc due at 22:16 requiring stat order as heparin scanned on arrival at [facility]. Pt current heparin order cancelled and restarted to generate appropriate next 6hr coag/cbc order.

## 2022-06-09 NOTE — ED PROVIDER NOTE - ATTENDING CONTRIBUTION TO CARE
Attending MD Villaseñor:  I personally have seen and examined this patient. I have performed a substantive portion of the visit including all aspects of the medical decision making.  Resident note reviewed and agree on plan of care and except where noted.      52F presenting from OSH for transient central chest pain with associated n/v, reported troponin elevation at OSH, given DAPT load and started on heparin gtt. Pt currently without symptoms, ECG without diagnostic acute ischemic changes. Seen by cardiology fellow, plan to continue medical therapies, serial biomarkers, plan tentatively for diagnostic cardiac cath in the AM           *The above represents an initial assessment/impression. Please refer to progress notes for potential changes in patient clinical course*

## 2022-06-09 NOTE — CONSULT NOTE ADULT - ASSESSMENT
51 yo F w/ no PMH who presents with chest pain found to have NSTEMI.     #Chest pain  #NSTEMI   EKG unrevealing. Troponin at  positive. Now asymptomatic. CXRAY unrevealing. Likely ACS, lower suspicion for dissection or PE.   -Continue heparin gtt   -Continue aspirin 81 and plavix 75 daily   -Will need cath in am   -Tele   -Recommend echo   -Recommend A1c and lipid panel  53 yo F w/ no PMH who presents with chest pain found to have NSTEMI.     #Chest pain  #NSTEMI   EKG unrevealing. Troponin at  positive. Now asymptomatic. CXRAY unrevealing. Likely ACS, lower suspicion for dissection or PE.   -Continue heparin gtt   -Continue aspirin 81 and plavix 75 daily   -Will need cath in am   -Tele   -Recommend echo   -Recommend A1c and lipid panel   -f/u troponin to [facility]  53 yo F w/ no PMH who presents with chest pain found to have NSTEMI.     #Chest pain  #NSTEMI   EKG unrevealing. Troponin at  positive. Now asymptomatic. CXRAY unrevealing. Likely ACS, lower suspicion for dissection or PE.   -Continue heparin gtt   -Continue aspirin 81 and plavix 75 daily   -Will need cath in am   -Tele   -Recommend echo   -Recommend A1c and lipid panel   -f/u troponin to [facility]     This patient was seen and examined personally by me and the plan was discussed with the fellow and/or resident above. Amendments were made as necessary to the above. Agree with the excellent note and plan above. 52F w/o PMH here w CP. NSTEMI on hep, asa, plavix. C pending.    Robert Mazariegos MD, MPhil, [facility]  Cardiologist, [facility]  ; [facility]  Email: [email or web address] Card[facility] and Cardiovascular Surgery on-service contact/call information, go to [email or web address] and use "[email or web address]" to login.  Outpatient Cardiology appointments, call [phone] to arrange with a colleague; I do not have outpatient Cardiology clinic.

## 2022-06-09 NOTE — ED ADULT TRIAGE NOTE - CHIEF COMPLAINT QUOTE
Pt states she woke from sleep at 0300 with substernal chest pain that lasted 2.5hrs, vomited 2x during that time. Pt states she was worried this morning, went to Urgent care, was told "EKG Normal, but go to ED for follow up and bloodwork." c/o 2/10 chest soreness.

## 2022-06-10 LAB
A1C WITH ESTIMATED AVERAGE GLUCOSE RESULT: 5.1 % — SIGNIFICANT CHANGE UP (ref 4–5.6)
ANION GAP SERPL CALC-SCNC: 10 MMOL/L — SIGNIFICANT CHANGE UP (ref 5–17)
APTT BLD: 31.1 SEC — SIGNIFICANT CHANGE UP (ref 27.5–35.5)
BASOPHILS # BLD AUTO: 0.08 K/UL — SIGNIFICANT CHANGE UP (ref 0–0.2)
BASOPHILS NFR BLD AUTO: 1.2 % — SIGNIFICANT CHANGE UP (ref 0–2)
BUN SERPL-MCNC: 11 MG/DL — SIGNIFICANT CHANGE UP (ref 7–23)
CALCIUM SERPL-MCNC: 8.8 MG/DL — SIGNIFICANT CHANGE UP (ref 8.4–10.5)
CHLORIDE SERPL-SCNC: 108 MMOL/L — SIGNIFICANT CHANGE UP (ref 96–108)
CHOLEST SERPL-MCNC: 139 MG/DL — SIGNIFICANT CHANGE UP
CK MB BLD-MCNC: 8.5 % — HIGH (ref 0–3.5)
CK MB CFR SERPL CALC: 9.8 NG/ML — HIGH (ref 0–3.8)
CK SERPL-CCNC: 115 U/L — SIGNIFICANT CHANGE UP (ref 25–170)
CO2 SERPL-SCNC: 23 MMOL/L — SIGNIFICANT CHANGE UP (ref 22–31)
CREAT SERPL-MCNC: 0.62 MG/DL — SIGNIFICANT CHANGE UP (ref 0.5–1.3)
EGFR: 107 ML/MIN/1.73M2 — SIGNIFICANT CHANGE UP
EOSINOPHIL # BLD AUTO: 0.16 K/UL — SIGNIFICANT CHANGE UP (ref 0–0.5)
EOSINOPHIL NFR BLD AUTO: 2.4 % — SIGNIFICANT CHANGE UP (ref 0–6)
ESTIMATED AVERAGE GLUCOSE: 100 MG/DL — SIGNIFICANT CHANGE UP (ref 68–114)
GLUCOSE SERPL-MCNC: 88 MG/DL — SIGNIFICANT CHANGE UP (ref 70–99)
HCG SERPL-ACNC: <2 MIU/ML — SIGNIFICANT CHANGE UP
HCT VFR BLD CALC: 35.6 % — SIGNIFICANT CHANGE UP (ref 34.5–45)
HCT VFR BLD CALC: 35.7 % — SIGNIFICANT CHANGE UP (ref 34.5–45)
HDLC SERPL-MCNC: 49 MG/DL — LOW
HGB BLD-MCNC: 11.6 G/DL — SIGNIFICANT CHANGE UP (ref 11.5–15.5)
HGB BLD-MCNC: 11.7 G/DL — SIGNIFICANT CHANGE UP (ref 11.5–15.5)
IMM GRANULOCYTES NFR BLD AUTO: 0.3 % — SIGNIFICANT CHANGE UP (ref 0–1.5)
LIPID PNL WITH DIRECT LDL SERPL: 77 MG/DL — SIGNIFICANT CHANGE UP
LYMPHOCYTES # BLD AUTO: 2.52 K/UL — SIGNIFICANT CHANGE UP (ref 1–3.3)
LYMPHOCYTES # BLD AUTO: 38.2 % — SIGNIFICANT CHANGE UP (ref 13–44)
MAGNESIUM SERPL-MCNC: 1.9 MG/DL — SIGNIFICANT CHANGE UP (ref 1.6–2.6)
MCHC RBC-ENTMCNC: 29.3 PG — SIGNIFICANT CHANGE UP (ref 27–34)
MCHC RBC-ENTMCNC: 29.4 PG — SIGNIFICANT CHANGE UP (ref 27–34)
MCHC RBC-ENTMCNC: 32.6 GM/DL — SIGNIFICANT CHANGE UP (ref 32–36)
MCHC RBC-ENTMCNC: 32.8 GM/DL — SIGNIFICANT CHANGE UP (ref 32–36)
MCV RBC AUTO: 89.7 FL — SIGNIFICANT CHANGE UP (ref 80–100)
MCV RBC AUTO: 89.9 FL — SIGNIFICANT CHANGE UP (ref 80–100)
MONOCYTES # BLD AUTO: 0.4 K/UL — SIGNIFICANT CHANGE UP (ref 0–0.9)
MONOCYTES NFR BLD AUTO: 6.1 % — SIGNIFICANT CHANGE UP (ref 2–14)
NEUTROPHILS # BLD AUTO: 3.41 K/UL — SIGNIFICANT CHANGE UP (ref 1.8–7.4)
NEUTROPHILS NFR BLD AUTO: 51.8 % — SIGNIFICANT CHANGE UP (ref 43–77)
NON HDL CHOLESTEROL: 90 MG/DL — SIGNIFICANT CHANGE UP
NRBC # BLD: 0 /100 WBCS — SIGNIFICANT CHANGE UP (ref 0–0)
NRBC # BLD: 0 /100 WBCS — SIGNIFICANT CHANGE UP (ref 0–0)
PLATELET # BLD AUTO: 215 K/UL — SIGNIFICANT CHANGE UP (ref 150–400)
PLATELET # BLD AUTO: 216 K/UL — SIGNIFICANT CHANGE UP (ref 150–400)
POTASSIUM SERPL-MCNC: 3.9 MMOL/L — SIGNIFICANT CHANGE UP (ref 3.5–5.3)
POTASSIUM SERPL-SCNC: 3.9 MMOL/L — SIGNIFICANT CHANGE UP (ref 3.5–5.3)
RBC # BLD: 3.96 M/UL — SIGNIFICANT CHANGE UP (ref 3.8–5.2)
RBC # BLD: 3.98 M/UL — SIGNIFICANT CHANGE UP (ref 3.8–5.2)
RBC # FLD: 12.7 % — SIGNIFICANT CHANGE UP (ref 10.3–14.5)
RBC # FLD: 12.8 % — SIGNIFICANT CHANGE UP (ref 10.3–14.5)
SODIUM SERPL-SCNC: 141 MMOL/L — SIGNIFICANT CHANGE UP (ref 135–145)
TRIGL SERPL-MCNC: 69 MG/DL — SIGNIFICANT CHANGE UP
TROPONIN T, HIGH SENSITIVITY RESULT: 373 NG/L — HIGH (ref 0–51)
TROPONIN T, HIGH SENSITIVITY RESULT: 433 NG/L — HIGH (ref 0–51)
WBC # BLD: 6.48 K/UL — SIGNIFICANT CHANGE UP (ref 3.8–10.5)
WBC # BLD: 6.59 K/UL — SIGNIFICANT CHANGE UP (ref 3.8–10.5)
WBC # FLD AUTO: 6.48 K/UL — SIGNIFICANT CHANGE UP (ref 3.8–10.5)
WBC # FLD AUTO: 6.59 K/UL — SIGNIFICANT CHANGE UP (ref 3.8–10.5)

## 2022-06-10 PROCEDURE — 93458 L HRT ARTERY/VENTRICLE ANGIO: CPT | Mod: 26

## 2022-06-10 PROCEDURE — 99152 MOD SED SAME PHYS/QHP 5/>YRS: CPT

## 2022-06-10 PROCEDURE — 99254 IP/OBS CNSLTJ NEW/EST MOD 60: CPT | Mod: 25

## 2022-06-10 PROCEDURE — 99233 SBSQ HOSP IP/OBS HIGH 50: CPT

## 2022-06-10 RX ORDER — INFLUENZA VIRUS VACCINE 15; 15; 15; 15 UG/.5ML; UG/.5ML; UG/.5ML; UG/.5ML
0.5 SUSPENSION INTRAMUSCULAR ONCE
Refills: 0 | Status: DISCONTINUED | OUTPATIENT
Start: 2022-06-10 | End: 2022-06-12

## 2022-06-10 RX ORDER — CLOPIDOGREL BISULFATE 75 MG/1
75 TABLET, FILM COATED ORAL DAILY
Refills: 0 | Status: DISCONTINUED | OUTPATIENT
Start: 2022-06-10 | End: 2022-06-11

## 2022-06-10 RX ORDER — SODIUM CHLORIDE 9 MG/ML
1000 INJECTION INTRAMUSCULAR; INTRAVENOUS; SUBCUTANEOUS
Refills: 0 | Status: DISCONTINUED | OUTPATIENT
Start: 2022-06-10 | End: 2022-06-12

## 2022-06-10 RX ORDER — SODIUM CHLORIDE 9 MG/ML
250 INJECTION INTRAMUSCULAR; INTRAVENOUS; SUBCUTANEOUS ONCE
Refills: 0 | Status: COMPLETED | OUTPATIENT
Start: 2022-06-10 | End: 2022-06-10

## 2022-06-10 RX ADMIN — HEPARIN SODIUM 1000 UNIT(S)/HR: 5000 INJECTION INTRAVENOUS; SUBCUTANEOUS at 08:36

## 2022-06-10 RX ADMIN — HEPARIN SODIUM 4100 UNIT(S): 5000 INJECTION INTRAVENOUS; SUBCUTANEOUS at 08:38

## 2022-06-10 RX ADMIN — HEPARIN SODIUM 800 UNIT(S)/HR: 5000 INJECTION INTRAVENOUS; SUBCUTANEOUS at 07:45

## 2022-06-10 RX ADMIN — ATORVASTATIN CALCIUM 40 MILLIGRAM(S): 80 TABLET, FILM COATED ORAL at 21:53

## 2022-06-10 RX ADMIN — SODIUM CHLORIDE 75 MILLILITER(S): 9 INJECTION INTRAMUSCULAR; INTRAVENOUS; SUBCUTANEOUS at 12:40

## 2022-06-10 RX ADMIN — CLOPIDOGREL BISULFATE 75 MILLIGRAM(S): 75 TABLET, FILM COATED ORAL at 17:23

## 2022-06-10 RX ADMIN — SODIUM CHLORIDE 750 MILLILITER(S): 9 INJECTION INTRAMUSCULAR; INTRAVENOUS; SUBCUTANEOUS at 11:52

## 2022-06-10 RX ADMIN — Medication 81 MILLIGRAM(S): at 11:05

## 2022-06-10 RX ADMIN — Medication 40 MILLIEQUIVALENT(S): at 00:08

## 2022-06-10 NOTE — PROGRESS NOTE ADULT - NSPROGADDITIONALINFOA_GEN_ALL_CORE
above plans discussed with NP Carolynn Flores MD  Division of Hospital Medicine  Contact via [employer] Teams  Office: [phone]

## 2022-06-10 NOTE — PATIENT PROFILE ADULT - CONTRAINDICATIONS & PRECAUTIONS (SELECT ALL THAT APPLY)
Carlin Neville is a 59year old male. HPI:   Patient presents with:  Physical: Fasting; Needs referral for eye exam   Back Pain    Patient presents for CPX/wellness examination. Diet: Lost 4 lbs during Ramadan.    Exercise:  Started walking on the treadmi Disp: 90 tablet, Rfl: 3  •  atorvastatin 10 MG Oral Tab, Take 1 tablet (10 mg total) by mouth nightly., Disp: 90 tablet, Rfl: 3  •  glipiZIDE ER 10 MG Oral Tablet 24 Hr, Take 1 tablet (10 mg total) by mouth daily. , Disp: 90 tablet, Rfl: 3  •  Pantoprazole atraumatic, PERRLA, EOMI, normal lid and conjunctiva, normal external canals and tympanic membranes bilaterally  NECK: supple, no jvd, no thyromegaly, no palpable/tender cervical lymphadenopathy  LUNGS: clear to auscultation bilaterally, no wheezing/rubs low back pain without sciatica  8. Acute bilateral thoracic back pain  Patient with chronic lower back pain; also with more acute thoracic pain. Both areas of back flaring up of late. Will check x-rays as below.   Chiropractor referral was approved (Dr. Samson Santana none...

## 2022-06-10 NOTE — PATIENT PROFILE ADULT - FUNCTIONAL ASSESSMENT - DAILY ACTIVITY ASSESSMENT TYPE
Patient here today for complaints of fever, vomiting, decreased appetite. Patient states that she also feels fatigued. Patient reports symptoms started yesterday. Patient was given 1000mg tylenol at 1830 for a temp of 102.8. Patient reports sore throat and cough.   
Admission

## 2022-06-10 NOTE — PROVIDER CONTACT NOTE (OTHER) - ASSESSMENT
Pt is alert and oriented x 4. VSS. Hep gtt at 800unit continues for ACS.  Aptt subtherapeutic.  Subsequent bolus order for aptt less than 40.

## 2022-06-10 NOTE — PROVIDER CONTACT NOTE (OTHER) - ACTION/TREATMENT ORDERED:
Subsequent heparin bolus 4100units given per Heparin nomogram recommendation and NP's approval. Orders followed and safety maintained.

## 2022-06-10 NOTE — PROGRESS NOTE ADULT - ASSESSMENT
51 yo F w/ no PMH who presents with chest pain found to have NSTEMI.     #Chest pain  #NSTEMI   -Cardiac enzymes have downtrended   -Continue heparin gtt   -Continue aspirin 81 and plavix 75 daily   -Plan for cath today  -Continue tele   -Follow up TTE    Calixto Mendoza MD  Department of Cardiology  Cardiology Fellow, PGY4

## 2022-06-10 NOTE — PROGRESS NOTE ADULT - ASSESSMENT
52F no PMH p/w chest pain x 3-hours. Transferred from  for NSTEMI pending [facility]

## 2022-06-10 NOTE — PROVIDER CONTACT NOTE (OTHER) - BACKGROUND
Pt 52 years old female admiited with NSTEMI: loaded ASA/plavix and started on heparin gtt, pending cath

## 2022-06-11 LAB
APTT BLD: 27.1 SEC — LOW (ref 27.5–35.5)
HCT VFR BLD CALC: 37.7 % — SIGNIFICANT CHANGE UP (ref 34.5–45)
HGB BLD-MCNC: 12.1 G/DL — SIGNIFICANT CHANGE UP (ref 11.5–15.5)
MCHC RBC-ENTMCNC: 28.8 PG — SIGNIFICANT CHANGE UP (ref 27–34)
MCHC RBC-ENTMCNC: 32.1 GM/DL — SIGNIFICANT CHANGE UP (ref 32–36)
MCV RBC AUTO: 89.8 FL — SIGNIFICANT CHANGE UP (ref 80–100)
NRBC # BLD: 0 /100 WBCS — SIGNIFICANT CHANGE UP (ref 0–0)
PLATELET # BLD AUTO: 200 K/UL — SIGNIFICANT CHANGE UP (ref 150–400)
RBC # BLD: 4.2 M/UL — SIGNIFICANT CHANGE UP (ref 3.8–5.2)
RBC # FLD: 12.6 % — SIGNIFICANT CHANGE UP (ref 10.3–14.5)
WBC # BLD: 7.96 K/UL — SIGNIFICANT CHANGE UP (ref 3.8–10.5)
WBC # FLD AUTO: 7.96 K/UL — SIGNIFICANT CHANGE UP (ref 3.8–10.5)

## 2022-06-11 PROCEDURE — 99233 SBSQ HOSP IP/OBS HIGH 50: CPT

## 2022-06-11 PROCEDURE — 99232 SBSQ HOSP IP/OBS MODERATE 35: CPT | Mod: GC,59

## 2022-06-11 PROCEDURE — 93306 TTE W/DOPPLER COMPLETE: CPT | Mod: 26

## 2022-06-11 RX ORDER — ENOXAPARIN SODIUM 100 MG/ML
40 INJECTION SUBCUTANEOUS EVERY 24 HOURS
Refills: 0 | Status: DISCONTINUED | OUTPATIENT
Start: 2022-06-11 | End: 2022-06-12

## 2022-06-11 RX ADMIN — ENOXAPARIN SODIUM 40 MILLIGRAM(S): 100 INJECTION SUBCUTANEOUS at 18:00

## 2022-06-11 RX ADMIN — ATORVASTATIN CALCIUM 40 MILLIGRAM(S): 80 TABLET, FILM COATED ORAL at 21:33

## 2022-06-11 NOTE — PROGRESS NOTE ADULT - ASSESSMENT
51 yo F w/ no PMH who presents with chest pain found to have NSTEMI s/p LHC w/ nonobstructive CAD.     #Chest pain  #NSTEMI   -Cardiac enzymes have downtrended   -nonobstructive CAD present on LHC, can d/c DAPT   -c/w lipitor 40 QD  -Continue tele   -Follow up TTE, cMRI 53 yo F w/ no PMH who presents with chest pain found to have NSTEMI s/p LHC w/ nonobstructive CAD.     #Chest pain  #NSTEMI   -Cardiac enzymes have downtrended   -nonobstructive CAD present on LHC, can d/c DAPT   -c/w lipitor 40 QD  -Continue tele   -Follow up TTE, cMRI    Raul Coronado MD  Department of Cardiology  Cardiology Fellow

## 2022-06-11 NOTE — PROGRESS NOTE ADULT - PROBLEM SELECTOR PLAN 1
chest pain with elevated troponin, admitted for NSTEMI  -s/p ASA/plavix load and started on heparin gtt at   -c/w Heparin gtt  -Calli peaked and now downtrending  -plan LH today  -continue on tele monitor
chest pain with elevated troponin, admitted for NSTEMI. C 20% obstruction dRCA otherwise wnl. TTE without WMA. Concern stress induced, currently no signs of dilation or WMA  -d/babar DAPT  -c/w atorva  -cards following, will f/u if still requires cMRI for further evaluation

## 2022-06-11 NOTE — PROGRESS NOTE ADULT - ASSESSMENT
52F no PMH p/w chest pain x 3-hours. Transferred from  for NSTEMI s/p [facility] with nonobstructive CAD. Likely stress induced.

## 2022-06-12 ENCOUNTER — TRANSCRIPTION ENCOUNTER (OUTPATIENT)
Age: 52
End: 2022-06-12

## 2022-06-12 VITALS
SYSTOLIC BLOOD PRESSURE: 121 MMHG | HEART RATE: 66 BPM | TEMPERATURE: 98 F | RESPIRATION RATE: 18 BRPM | OXYGEN SATURATION: 96 % | DIASTOLIC BLOOD PRESSURE: 66 MMHG

## 2022-06-12 LAB
HCT VFR BLD CALC: 37.6 % — SIGNIFICANT CHANGE UP (ref 34.5–45)
HGB BLD-MCNC: 12.6 G/DL — SIGNIFICANT CHANGE UP (ref 11.5–15.5)
MCHC RBC-ENTMCNC: 29.9 PG — SIGNIFICANT CHANGE UP (ref 27–34)
MCHC RBC-ENTMCNC: 33.5 GM/DL — SIGNIFICANT CHANGE UP (ref 32–36)
MCV RBC AUTO: 89.1 FL — SIGNIFICANT CHANGE UP (ref 80–100)
NRBC # BLD: 0 /100 WBCS — SIGNIFICANT CHANGE UP (ref 0–0)
PLATELET # BLD AUTO: 207 K/UL — SIGNIFICANT CHANGE UP (ref 150–400)
RBC # BLD: 4.22 M/UL — SIGNIFICANT CHANGE UP (ref 3.8–5.2)
RBC # FLD: 12.7 % — SIGNIFICANT CHANGE UP (ref 10.3–14.5)
WBC # BLD: 7.4 K/UL — SIGNIFICANT CHANGE UP (ref 3.8–10.5)
WBC # FLD AUTO: 7.4 K/UL — SIGNIFICANT CHANGE UP (ref 3.8–10.5)

## 2022-06-12 PROCEDURE — 99239 HOSP IP/OBS DSCHRG MGMT >30: CPT

## 2022-06-12 PROCEDURE — 80053 COMPREHEN METABOLIC PANEL: CPT

## 2022-06-12 PROCEDURE — 85025 COMPLETE CBC W/AUTO DIFF WBC: CPT

## 2022-06-12 PROCEDURE — 76376 3D RENDER W/INTRP POSTPROCES: CPT

## 2022-06-12 PROCEDURE — 83036 HEMOGLOBIN GLYCOSYLATED A1C: CPT

## 2022-06-12 PROCEDURE — 83735 ASSAY OF MAGNESIUM: CPT

## 2022-06-12 PROCEDURE — 96374 THER/PROPH/DIAG INJ IV PUSH: CPT

## 2022-06-12 PROCEDURE — C1894: CPT

## 2022-06-12 PROCEDURE — 99232 SBSQ HOSP IP/OBS MODERATE 35: CPT | Mod: GC

## 2022-06-12 PROCEDURE — 85027 COMPLETE CBC AUTOMATED: CPT

## 2022-06-12 PROCEDURE — C1769: CPT

## 2022-06-12 PROCEDURE — 84484 ASSAY OF TROPONIN QUANT: CPT

## 2022-06-12 PROCEDURE — 93458 L HRT ARTERY/VENTRICLE ANGIO: CPT

## 2022-06-12 PROCEDURE — 99291 CRITICAL CARE FIRST HOUR: CPT | Mod: 25

## 2022-06-12 PROCEDURE — 36415 COLL VENOUS BLD VENIPUNCTURE: CPT

## 2022-06-12 PROCEDURE — 84702 CHORIONIC GONADOTROPIN TEST: CPT

## 2022-06-12 PROCEDURE — 82553 CREATINE MB FRACTION: CPT

## 2022-06-12 PROCEDURE — 82550 ASSAY OF CK (CPK): CPT

## 2022-06-12 PROCEDURE — 80061 LIPID PANEL: CPT

## 2022-06-12 PROCEDURE — 80048 BASIC METABOLIC PNL TOTAL CA: CPT

## 2022-06-12 PROCEDURE — 93306 TTE W/DOPPLER COMPLETE: CPT

## 2022-06-12 PROCEDURE — 85610 PROTHROMBIN TIME: CPT

## 2022-06-12 PROCEDURE — C1887: CPT

## 2022-06-12 PROCEDURE — 85730 THROMBOPLASTIN TIME PARTIAL: CPT

## 2022-06-12 PROCEDURE — 99152 MOD SED SAME PHYS/QHP 5/>YRS: CPT

## 2022-06-12 RX ORDER — ATORVASTATIN CALCIUM 80 MG/1
1 TABLET, FILM COATED ORAL
Qty: 30 | Refills: 0
Start: 2022-06-12 | End: 2022-07-11

## 2022-06-12 NOTE — DISCHARGE NOTE PROVIDER - HOSPITAL COURSE
52 year old female no PMH p/w chest pain x 3-hours. Transferred from [place] for NSTEMI s/p [facility] with nonobstructive CAD. Likely stress induced. Echocardiogram without wall motion abnormality.  No further inpatient cardiac workup.  Patient stable for discharge home.  Will follow up with Dr Aguilar Armendariz on discharge.

## 2022-06-12 NOTE — DISCHARGE NOTE PROVIDER - PROVIDER TOKENS
PROVIDER:[TOKEN:[[id number]],FOLLOWUP:[Routine]],PROVIDER:[TOKEN:[[id number]],FOLLOWUP:[1 week]]

## 2022-06-12 NOTE — DISCHARGE NOTE NURSING/CASE MANAGEMENT/SOCIAL WORK - NSDCPEFALRISK_GEN_ALL_CORE
For information on Fall & Injury Prevention, visit: [email or web address] OR  [email or web address] OR  https://www.cdc.gov/steadi/patient.html

## 2022-06-12 NOTE — DISCHARGE NOTE PROVIDER - NSDCFUADDAPPT_GEN_ALL_CORE_FT
APPTS ARE READY TO BE MADE: [x] YES    Best Family or Patient Contact (if needed):    Additional Information about above appointments (if needed):    1:   2:   3:     Other comments or requests:    APPTS ARE READY TO BE MADE: [x] YES    Best Family or Patient Contact (if needed):    Additional Information about above appointments (if needed):    1:   2:   3:     Other comments or requests:   Patient was provided with information for Dr. Armendariz and Dr. Lynch and advised to call to schedule follow up within specified time frame.

## 2022-06-12 NOTE — DISCHARGE NOTE NURSING/CASE MANAGEMENT/SOCIAL WORK - PATIENT PORTAL LINK FT
You can access the FollowMyHealth Patient Portal offered by [facility] by registering at the following website: [email or web address]. By joining [employer]’s FollowMyHealth portal, you will also be able to view your health information using other applications (apps) compatible with our system.

## 2022-06-12 NOTE — DISCHARGE NOTE PROVIDER - NSDCCPCAREPLAN_GEN_ALL_CORE_FT
PRINCIPAL DISCHARGE DIAGNOSIS  Diagnosis: NSTEMI (non-ST elevation myocardial infarction)  Assessment and Plan of Treatment: Follow up with Dr Aguilar Armendariz within 1-2 weeks of discharge.  Call your doctor if you have unusual chest pain, pressure, or discomfort, shortness of breath, nausea, vomiting, burping, heartburn, tingling upper body parts, sweating, cold, clammy sking, racing heartbeat  Call 911 if you think you are having a heart attack  Take all cardiac medications as prescribed - notify your doctor if you have any side effects  Follow cardiac diet - avoid fatty & fried foods, don't eat too much red meat, eat lots of fruits & vegetables, dairy products should be low fat  Lose weight if you are overweight  Become more active with walking, gardening, or any other activity that gets you to move

## 2022-06-12 NOTE — CHART NOTE - NSCHARTNOTEFT_GEN_A_CORE
patient seen and examined, briefly: 52F no PMH p/w chest pain x 3-hours. Transferred from  for NSTEMI s/p [facility] with nonobstructive CAD. TTE with normal EF with no WMA. Trops downtrended, cp resolved. Likely stress induced vs vasospasm. Stable for d/c with f/u outpatient    Exam  CONSTITUTIONAL: NAD, well-developed, well-groomed  EYES: PERRLA; conjunctiva and sclera clear  ENMT: Moist oral mucosa, no pharyngeal injection or exudates; normal dentition  RESPIRATORY: Normal respiratory effort; lungs are clear to auscultation bilaterally  CARDIOVASCULAR: Regular rate and rhythm, normal S1 and S2, no murmur/rub/gallop; No lower extremity edema; Peripheral pulses are 2+ bilaterally  ABDOMEN: Nontender to palpation, normoactive bowel sounds, no rebound/guarding; No hepatosplenomegaly  MUSCULOSKELETAL: no clubbing or cyanosis of digits; no joint swelling or tenderness to palpation  PSYCH: A+O to person, place, and time; affect appropriate  NEUROLOGY: CN 2-12 are intact and symmetric; no gross sensory deficits   SKIN: No rashes; no palpable lesions      [facility]  LM   Left main artery: The segment is visually normal in size and  structure.    LAD   Left anterior descending artery: The segment is visually normal in  size and structure.    CX   Circumflex: The segment is visually normal in size and structure.      RCA   Right coronary artery: Angiography shows minor irregularities.    TTE  1. Calcified trileaflet aortic valve with decreased  opening.  2. Normal left ventricular internal dimensions and wall  thicknesses.  3. Normal left ventricular systolic function. No segmental  wall motion abnormalities. Paradoxical septal wall motion.  4. Normal right ventricular size and function.    Plan   -started on atorvastatin  -f/u with outpatient cardiologist    Discharge time spent by provider 43 minutes

## 2022-06-12 NOTE — PROGRESS NOTE ADULT - ATTENDING COMMENTS
Patient seen and examined. Agree with assessment and plan as outlined above. Echo with normal EF. No significant disease on cath. Unclear cause of symptoms. Possibly stress vs vasospasm. Agree with d/c with close cardiology follow up.
Patient seen and examined. Agree with assessment and plan as outlined above. Likely stress CM. Awaiting echo. Currently without symptoms. Further management pending results of echo. Same medications for now.

## 2022-06-12 NOTE — DISCHARGE NOTE PROVIDER - CARE PROVIDER_API CALL
Seth Lynch)  Family Medicine  [address] [place] [address]  Phone: [phone]  Fax: [phone]  Follow Up Time: Routine    Aguilar Armendariz)  Cardiology; Cardiovascular Disease; Internal Medicine  [facility] [facility] [address]  [place] [address]  Phone: [phone]  Fax: [phone]  Follow Up Time: 1 week

## 2022-06-12 NOTE — PROGRESS NOTE ADULT - SUBJECTIVE AND OBJECTIVE BOX
Cardiology Consult Progress Note    Patient seen and examined at bedside.    Overnight Events:   - Seen by on call fellow for NSTEMI. Plan for [facility].    Review Of Systems: No chest pain, shortness of breath, or palpitations            Current Meds:  acetaminophen     Tablet .. 650 milliGRAM(s) Oral every 6 hours PRN  aluminum hydroxide/magnesium hydroxide/simethicone Suspension 30 milliLiter(s) Oral every 4 hours PRN  aspirin enteric coated 81 milliGRAM(s) Oral daily  atorvastatin 40 milliGRAM(s) Oral at bedtime  heparin   Injectable 4100 Unit(s) IV Push every 6 hours PRN  heparin  Infusion.  Unit(s)/Hr IV Continuous <Continuous>  influenza   Vaccine 0.5 milliLiter(s) IntraMuscular once  melatonin 3 milliGRAM(s) Oral at bedtime PRN  nitroglycerin     SubLingual 0.4 milliGRAM(s) SubLingual every 5 minutes PRN  ondansetron Injectable 4 milliGRAM(s) IV Push every 8 hours PRN      Vitals:  T(F): 98 (-10), Max: 98.1 (-09)  HR: 73 (10) (63 - 73)  BP: 107/73 (-10) (107/73 - 147/94)  RR: 18 (-10)  SpO2: 97% (06-10)  I&O's Summary    10 [place] 2022 07:01  -  10 [place] 2022 10:45  --------------------------------------------------------  IN: 240 mL / OUT: 0 mL / NET: 240 mL    Physical Exam:  GENERAL: No acute distress, well-developed  CHEST/LUNG: Clear to auscultation bilaterally; No wheeze, equal breath sounds bilaterally   HEART: Regular rate and rhythm; No murmurs, rubs, or gallops  EXTREMITIES:  No clubbing, cyanosis, or edema  PSYCH: Nl behavior, nl affect  NEUROLOGY: AAOx3, non-focal   SKIN: Normal color, No rashes or lesions                        11.7   6.59  )-----------( 216      ( 10 [place] 2022 07:27 )             35.7     06-10    141  |  108  |  11  ----------------------------<  88  3.9   |  23  |  0.62    Ca    8.8      10 [place] 2022 07:13  Mg     1.9     06-10    TPro  6.7  /  Alb  4.0  /  TBili  0.2  /  DBili  x   /  AST  33  /  ALT  13  /  AlkPhos  46  06-09    PT/INR - ( 2022 22:16 )   PT: 14.2 sec;   INR: 1.22 ratio        PTT - ( 10 [place] 2022 07:17 )  PTT:31.1 sec  CARDIAC MARKERS ( 10 [place] 2022 07:13 )  373 ng/L / x     / x     / 115 U/L / x     / 9.8 ng/mL  CARDIAC MARKERS ( 10 [place] 2022 01:11 )  433 ng/L / x     / x     / x     / x     / x      CARDIAC MARKERS ( 2022 19:15 )  866 ng/L / x     / x     / x     / x     / 20.2 ng/mL    Total Cholesterol: 139  LDL: --  HDL: 49  T
INTERVAL EVENTS: No o/n events. Denies CP, dyspnea, palpitations, presyncope, syncope, f/c/n/v.     REVIEW OF SYSTEMS:  Constitutional:     [X] negative [ ] fevers [ ] chills [ ] weight loss [ ] weight gain  HEENT:                  [X] negative [ ] dry eyes [ ] eye irritation [ ] postnasal drip [ ] nasal congestion  CV:                         [X] negative  [ ] chest pain [ ] orthopnea [ ] palpitations [ ] murmur  Resp:                     [X] negative [ ] cough [ ] shortness of breath [ ] wheezing [ ] sputum [ ] hemoptysis  GI:                          [X] negative [ ] nausea [ ] vomiting [ ] diarrhea [ ] constipation [ ] abd pain [ ] dysphagia   :                        [X] negative [ ] dysuria [ ] nocturia [ ] hematuria [ ] increased urinary frequency  MSK:                      [X] negative [ ] back pain [ ] myalgias [ ] arthralgias [ ] fracture  Skin:                       [X] negative [ ] rash [ ] itch  Neuro:                   [X] negative [ ] headache [ ] dizziness [ ] syncope [ ] weakness [ ] numbness  Psych:                    [X] negative [ ] anxiety [ ] depression  Endo:                     [X] negative [ ] diabetes [ ] thyroid problem  Heme/Lymph:      [X] negative [ ] anemia [ ] bleeding problem  Allergic/Immune: [X] negative [ ] itchy eyes [ ] nasal discharge [ ] hives [ ] angioedema    [X] All other systems negative or otherwise described above.  [ ] Unable to assess ROS because ________.    PAST MEDICAL & SURGICAL HISTORY:  No pertinent past medical history    No significant past surgical history    No significant past surgical history      MEDICATIONS  (STANDING):  aspirin enteric coated 81 milliGRAM(s) Oral daily  atorvastatin 40 milliGRAM(s) Oral at bedtime  clopidogrel Tablet 75 milliGRAM(s) Oral daily  influenza   Vaccine 0.5 milliLiter(s) IntraMuscular once  sodium chloride 0.9%. 1000 milliLiter(s) (75 mL/Hr) IV Continuous <Continuous>    MEDICATIONS  (PRN):  acetaminophen     Tablet .. 650 milliGRAM(s) Oral every 6 hours PRN Temp greater or equal to 38C (100.4F), Mild Pain (1 - 3)  aluminum hydroxide/magnesium hydroxide/simethicone Suspension 30 milliLiter(s) Oral every 4 hours PRN Dyspepsia  melatonin 3 milliGRAM(s) Oral at bedtime PRN Insomnia  nitroglycerin     SubLingual 0.4 milliGRAM(s) SubLingual every 5 minutes PRN Chest Pain  ondansetron Injectable 4 milliGRAM(s) IV Push every 8 hours PRN Nausea and/or Vomiting    ICU Vital Signs Last 24 Hrs  T(C): 36.7 (11 Jun 2022 05:06), Max: 37.1 (10 [place] 2022 11:29)  T(F): 98 (11 Jun 2022 05:06), Max: 98.7 (10 [place] 2022 11:29)  HR: 70 (11 Jun 2022 05:06) (62 - 78)  BP: 115/76 (11 Jun 2022 05:06) (101/66 - 126/81)  BP(mean): --  ABP: --  ABP(mean): --  RR: 18 (11 Jun 2022 05:06) (12 - 18)  SpO2: 96% (11 Jun 2022 05:06) (96% - 100%)    Orthostatic VS    Daily     Daily   I&O's Summary    10 [place] 2022 07:01  -  11 Jun 2022 07:00  --------------------------------------------------------  IN: 480 mL / OUT: 0 mL / NET: 480 mL        PHYSICAL EXAM:  GENERAL: No acute distress, well-developed  CHEST/LUNG: Clear to auscultation bilaterally; No wheeze, equal breath sounds bilaterally   HEART: Regular rate and rhythm; No murmurs, rubs, or gallops  EXTREMITIES:  No clubbing, cyanosis, or edema; R wrist w/ mild TTP, no bruit, induration, hematoma, or edema Radial pulses palpable b/l  PSYCH: Nl behavior, nl affect  NEUROLOGY: AAOx3, non-focal   SKIN: Normal color, No rashes or lesions    LABS:                        12.1   7.96  )-----------( 200      ( 11 Jun 2022 06:48 )             37.7     PT/INR - ( 09 Jun 2022 22:16 )   PT: 14.2 sec;   INR: 1.22 ratio         PTT - ( 11 Jun 2022 06:48 )  PTT:27.1 sec  06-10    141  |  108  |  11  ----------------------------<  88  3.9   |  23  |  0.62    Ca    8.8      10 [place] 2022 07:13  Mg     1.9     06-10    TPro  6.7  /  Alb  4.0  /  TBili  0.2  /  DBili  x   /  AST  33  /  ALT  13  /  AlkPhos  46  06-09    CARDIAC MARKERS ( 10 [place] 2022 07:13 )  x     / x     / 115 U/L / x     / 9.8 ng/mL  CARDIAC MARKERS ( 09 Jun 2022 19:15 )  x     / x     / x     / x     / 20.2 ng/mL         Lipid Profile: Cholesterol 139 mg/dL, Triglyceride 69 mg/dL, LDL 77 mg/dL, HDL 49 mg/dL, [06-10-22]    HgA1c: A1C with Estimated Average Glucose (06.10.22 @ 07:27)    A1C with Estimated Average Glucose Result: 5.1: Method: Immunoassay       Reference Range                4.0-5.6%       High risk (prediabetic)        5.7-6.4%       Diabetic, diagnostic             >=6.5%       ADA diabetic treatment goal       <7.0%  The Hemoglobin A1c testing is NGSP-certified.Reference ranges are based  upon the 2010 recommendations of  the American Diabetes Association.  Interpretation may vary for children  and adolescents. %    Estimated Average Glucose: 100: The Estimated Average Glucose (eAG) or Mean Plasma Glucose (MPG) value is  calculated from the hemoglobin A1c value and covers the same time period.   The American Diabetes Association (ADA) and other professional  organizations recommend reporting the eAG with the HgbA1c. mg/dL      TSH:         RADIOLOGY & ADDITIONAL STUDIES:    Cardiovascular Diagnostic Testing    Telemetry: reviewed, NSR    Echo: none    Stress Testing: none    Cath: report pending    CXR: none    Other cardiac imaging: none    Other misc imaging: none      
Patient seen and examined at bedside.    Overnight Events: no events, issues or complaints    Review of Systems:  REVIEW OF SYSTEMS:  CONSTITUTIONAL: No weakness, fevers or chills  EYES/ENT: No visual changes;  No dysphagia  NECK: No pain or stiffness  RESPIRATORY: No cough, wheezing, hemoptysis; No shortness of breath  CARDIOVASCULAR: No chest pain or palpitations; No lower extremity edema  GASTROINTESTINAL: No abdominal or epigastric pain. No nausea, vomiting, or hematemesis; No diarrhea or constipation. No melena or hematochezia.  BACK: No back pain  GENITOURINARY: No dysuria, frequency or hematuria  NEUROLOGICAL: No numbness or weakness  SKIN: No itching, burning, rashes, or lesions   All other review of systems is negative unless indicated above.    [ x] All other systems negative  [ ] Unable to assess ROS due to    Current Meds:  acetaminophen     Tablet .. 650 milliGRAM(s) Oral every 6 hours PRN  aluminum hydroxide/magnesium hydroxide/simethicone Suspension 30 milliLiter(s) Oral every 4 hours PRN  atorvastatin 40 milliGRAM(s) Oral at bedtime  enoxaparin Injectable 40 milliGRAM(s) SubCutaneous every 24 hours  influenza   Vaccine 0.5 milliLiter(s) IntraMuscular once  melatonin 3 milliGRAM(s) Oral at bedtime PRN  nitroglycerin     SubLingual 0.4 milliGRAM(s) SubLingual every 5 minutes PRN  ondansetron Injectable 4 milliGRAM(s) IV Push every 8 hours PRN  sodium chloride 0.9%. 1000 milliLiter(s) IV Continuous <Continuous>      PAST MEDICAL & SURGICAL HISTORY:  No pertinent past medical history      No significant past surgical history          Vitals:  T(F): 98 (06-12), Max: 98.4 (06-11)  HR: 63 (06-12) (63 - 81)  BP: 106/62 (06-12) (106/62 - 119/79)  RR: 18 (06-12)  SpO2: 96% (06-12)  I&O's Summary    11 Jun 2022 07:01  -  12 Jun 2022 07:00  --------------------------------------------------------  IN: 500 mL / OUT: 0 mL / NET: 500 mL        Physical Exam:  Appearance: No acute distress; well appearing  Eyes: PERRL, EOMI, pink conjunctiva  HENT: Normal oral mucosa  Cardiovascular: RRR, S1, S2, no murmurs, rubs, or gallops; no edema; no JVD  Respiratory: Clear to auscultation bilaterally  Gastrointestinal: soft, non-tender, non-distended with normal bowel sounds  Musculoskeletal: No clubbing; no joint deformity   Neurologic: Non-focal  Lymphatic: No lymphadenopathy  Psychiatry: AAOx3, mood & affect appropriate  Skin: No rashes, ecchymoses, or cyanosis                          12.6   7.40  )-----------( 207      ( 12 Jun 2022 06:55 )             37.6           PTT - ( 11 Jun 2022 06:48 )  PTT:27.1 sec                
Patient is a 52y old  Female who presents with a chief complaint of chest pain (10 [place] 2022 10:42)      SUBJECTIVE / OVERNIGHT EVENTS:  no acute events overnight, vss, afebrile  pt feels well this morning, waiting for [facility]  currently denies chest pain, dyspnea, palpitation    tele reviewed: sinus 60-70s    ROS:  14 point ROS negative in detail except stated as above    MEDICATIONS  (STANDING):  aspirin enteric coated 81 milliGRAM(s) Oral daily  atorvastatin 40 milliGRAM(s) Oral at bedtime  heparin  Infusion.  Unit(s)/Hr (8 mL/Hr) IV Continuous <Continuous>  influenza   Vaccine 0.5 milliLiter(s) IntraMuscular once    MEDICATIONS  (PRN):  acetaminophen     Tablet .. 650 milliGRAM(s) Oral every 6 hours PRN Temp greater or equal to 38C (100.4F), Mild Pain (1 - 3)  aluminum hydroxide/magnesium hydroxide/simethicone Suspension 30 milliLiter(s) Oral every 4 hours PRN Dyspepsia  heparin   Injectable 4100 Unit(s) IV Push every 6 hours PRN For aPTT less than 40  melatonin 3 milliGRAM(s) Oral at bedtime PRN Insomnia  nitroglycerin     SubLingual 0.4 milliGRAM(s) SubLingual every 5 minutes PRN Chest Pain  ondansetron Injectable 4 milliGRAM(s) IV Push every 8 hours PRN Nausea and/or Vomiting      CAPILLARY BLOOD GLUCOSE        I&O's Summary    10 [place] 2022 07:01  -  10 [place] 2022 10:57  --------------------------------------------------------  IN: 240 mL / OUT: 0 mL / NET: 240 mL        PHYSICAL EXAM:  Vital Signs Last 24 Hrs  T(C): 36.7 (10 [place] 2022 04:24), Max: 36.7 (09 Jun 2022 18:50)  T(F): 98 (10 [place] 2022 04:24), Max: 98.1 (09 Jun 2022 18:50)  HR: 73 (10 [place] 2022 04:24) (63 - 73)  BP: 107/73 (10 [place] 2022 04:24) (107/73 - 147/94)  BP(mean): 109 (09 Jun 2022 18:50) (109 - 109)  RR: 18 (10 [place] 2022 04:24) (18 - 19)  SpO2: 97% (10 [place] 2022 04:24) (97% - 100%)    GENERAL: NAD, well-developed  HEAD:  Atraumatic, Normocephalic  EYES: EOMI, PERRLA, conjunctiva and sclera clear  NECK: Supple, No JVD  CHEST/LUNG: Clear to auscultation bilaterally; No wheeze  HEART: Regular rate and rhythm; No murmurs, rubs, or gallops  ABDOMEN: Soft, Nontender, Nondistended; Bowel sounds present  EXTREMITIES:  2+ Peripheral Pulses, No clubbing, cyanosis, or edema  NEUROLOGY: AAOx3; non-focal  SKIN: No rashes or lesions    LABS:  personally reviewed                        11.7   6.59  )-----------( 216      ( 10 [place] 2022 07:27 )             35.7     06-10    141  |  108  |  11  ----------------------------<  88  3.9   |  23  |  0.62    Ca    8.8      10 [place] 2022 07:13  Mg     1.9     06-10    TPro  6.7  /  Alb  4.0  /  TBili  0.2  /  DBili  x   /  AST  33  /  ALT  13  /  AlkPhos  46  06-09    PT/INR - ( 09 Jun 2022 22:16 )   PT: 14.2 sec;   INR: 1.22 ratio         PTT - ( 10 [place] 2022 07:17 )  PTT:31.1 sec    HS troponin: 866 -> 433 -> 373        RADIOLOGY & ADDITIONAL TESTS:    Imaging Personally Reviewed:    Consultant(s) Notes Reviewed:  cardiology    Care Discussed with Consultants/Other Providers: Dr. Mendoza (cards)  
[facility] Division of Hospital Medicine  Cole Dooley  Pager (LUCIA-F, 8A-5P): [phone]  Other Times:  [phone]    CC: 51 y/o F presenting with chest pain    SUBJECTIVE / OVERNIGHT EVENTS:  s/p LHC with non obstructive CAD  Trops downtrended  Currently CP free  Denies sob/LE swelling    ADDITIONAL REVIEW OF SYSTEMS:    MEDICATIONS  (STANDING):  atorvastatin 40 milliGRAM(s) Oral at bedtime  influenza   Vaccine 0.5 milliLiter(s) IntraMuscular once  sodium chloride 0.9%. 1000 milliLiter(s) (75 mL/Hr) IV Continuous <Continuous>    MEDICATIONS  (PRN):  acetaminophen     Tablet .. 650 milliGRAM(s) Oral every 6 hours PRN Temp greater or equal to 38C (100.4F), Mild Pain (1 - 3)  aluminum hydroxide/magnesium hydroxide/simethicone Suspension 30 milliLiter(s) Oral every 4 hours PRN Dyspepsia  melatonin 3 milliGRAM(s) Oral at bedtime PRN Insomnia  nitroglycerin     SubLingual 0.4 milliGRAM(s) SubLingual every 5 minutes PRN Chest Pain  ondansetron Injectable 4 milliGRAM(s) IV Push every 8 hours PRN Nausea and/or Vomiting      I&O's Summary    10 [place] 2022 07:01  -  11 Jun 2022 07:00  --------------------------------------------------------  IN: 480 mL / OUT: 0 mL / NET: 480 mL    11 Jun 2022 07:01  -  11 Jun 2022 15:14  --------------------------------------------------------  IN: 200 mL / OUT: 0 mL / NET: 200 mL        PHYSICAL EXAM:  Vital Signs Last 24 Hrs  T(C): 36.9 (11 Jun 2022 11:21), Max: 37.1 (10 [place] 2022 16:45)  T(F): 98.4 (11 Jun 2022 11:21), Max: 98.7 (10 [place] 2022 16:45)  HR: 81 (11 Jun 2022 11:21) (66 - 81)  BP: 119/79 (11 Jun 2022 11:21) (101/66 - 123/84)  BP(mean): --  RR: 18 (11 Jun 2022 11:21) (15 - 18)  SpO2: 97% (11 Jun 2022 11:21) (96% - 99%)    CONSTITUTIONAL: NAD, well-developed, well-groomed  EYES: PERRLA; conjunctiva and sclera clear  ENMT: Moist oral mucosa, no pharyngeal injection or exudates; normal dentition  RESPIRATORY: Normal respiratory effort; lungs are clear to auscultation bilaterally  CARDIOVASCULAR: Regular rate and rhythm, normal S1 and S2, no murmur/rub/gallop; No lower extremity edema; Peripheral pulses are 2+ bilaterally  ABDOMEN: Nontender to palpation, normoactive bowel sounds, no rebound/guarding; No hepatosplenomegaly  MUSCULOSKELETAL: no clubbing or cyanosis of digits; no joint swelling or tenderness to palpation  PSYCH: A+O to person, place, and time; affect appropriate  NEUROLOGY: CN 2-12 are intact and symmetric; no gross sensory deficits   SKIN: No rashes; no palpable lesions    LABS:                        12.1   7.96  )-----------( 200      ( 11 Jun 2022 06:48 )             37.7     06-10    141  |  108  |  11  ----------------------------<  88  3.9   |  23  |  0.62    Ca    8.8      10 [place] 2022 07:13  Mg     1.9     06-10    TPro  6.7  /  Alb  4.0  /  TBili  0.2  /  DBili  x   /  AST  33  /  ALT  13  /  AlkPhos  46  06-09    PT/INR - ( 09 Jun 2022 22:16 )   PT: 14.2 sec;   INR: 1.22 ratio         PTT - ( 11 Jun 2022 06:48 )  PTT:27.1 sec  CARDIAC MARKERS ( 10 [place] 2022 07:13 )  x     / x     / 115 U/L / x     / 9.8 ng/mL  CARDIAC MARKERS ( 09 Jun 2022 19:15 )  x     / x     / x     / x     / 20.2 ng/mL        TTE  1. Calcified trileaflet aortic valve with decreased  opening.  2. Normal left ventricular internal dimensions and wall  thicknesses.  3. Normal left ventricular systolic function. No segmental  wall motion abnormalities. Paradoxical septal wall motion.  4. Normal right ventricular size and function.

## 2022-06-12 NOTE — DISCHARGE NOTE PROVIDER - NSDCHC_MEDRECSTATUS_GEN_ALL_CORE
18-Mar-2021 06:40:09 Admission Reconciliation is Not Complete  Discharge Reconciliation is Completed

## 2022-06-12 NOTE — PROGRESS NOTE ADULT - ASSESSMENT
53 yo F w/ no PMH who presents with chest pain found to have NSTEMI s/p LHC w/ nonobstructive CAD. Echo w/ normal LV function. Currently, HDS and asymptomatic. Differential inlcudes stress-induced CM.    Recs:  - con't statin  - can be follow up outpatient for remainder of cardiac care  - Please arrange follow up with primary cardiologist within 7-10 days of discharge. If patient needs a new cardiologist, please call [phone] and arrange for the patient to follow up at [facility]    William Desai MD  Cardiology Fellow PGY-5  [facility]    Notes are not final until signed by attending  For all consults and questions:  [email or web address]   Login: [email or web address]

## 2022-06-13 PROBLEM — Z00.00 ENCOUNTER FOR PREVENTIVE HEALTH EXAMINATION: Noted: 2022-06-13

## 2022-06-14 PROBLEM — Z78.9 OTHER SPECIFIED HEALTH STATUS: Chronic | Status: ACTIVE | Noted: 2022-06-09

## 2022-06-15 ENCOUNTER — LABORATORY RESULT (OUTPATIENT)
Age: 52
End: 2022-06-15

## 2022-06-15 ENCOUNTER — NON-APPOINTMENT (OUTPATIENT)
Age: 52
End: 2022-06-15

## 2022-06-15 ENCOUNTER — APPOINTMENT (OUTPATIENT)
Dept: CARDIOLOGY | Facility: CLINIC | Age: 52
End: 2022-06-15
Payer: COMMERCIAL

## 2022-06-15 VITALS
DIASTOLIC BLOOD PRESSURE: 78 MMHG | TEMPERATURE: 98.2 F | SYSTOLIC BLOOD PRESSURE: 125 MMHG | OXYGEN SATURATION: 96 % | HEIGHT: 64 IN | RESPIRATION RATE: 16 BRPM | HEART RATE: 89 BPM | WEIGHT: 153 LBS | BODY MASS INDEX: 26.12 KG/M2

## 2022-06-15 PROBLEM — Z78.9 OTHER SPECIFIED HEALTH STATUS: Chronic | Status: ACTIVE | Noted: 2022-06-09

## 2022-06-15 PROCEDURE — 93000 ELECTROCARDIOGRAM COMPLETE: CPT

## 2022-06-15 PROCEDURE — 99214 OFFICE O/P EST MOD 30 MIN: CPT

## 2022-06-15 NOTE — DISCUSSION/SUMMARY
[FreeTextEntry1] : This is a 52-year-old female with no significant past medical history, who comes in for cardiac consultation.  The patient was well until June 8, 2022 when she woke at 3:30 in the morning with chest pressure, nausea and vomiting twice.  The pain did not subside and she went to [facility] where her cardiac enzymes were elevated, and she was transferred to [facility].\par Cardiac catheterization done [place] 10, 2022 demonstrated normal left main artery, normal left anterior descending artery, normal left circumflex artery, right coronary artery with minor irregularities, and a small diaphragmatic area of hypokinesis.\par The patient remained in the hospital 72 hours and reportedly had an echo Doppler examination done.\par It was suspect the patient could have had clot lysis, or coronary spasm.  She was under a lot of stress related to her sister's visit but did not have any specific emotional escalation, and Takotsubo cardiomyopathy is less likely.  She was discharged on Lipitor 40 mg daily and aspirin 81 mg/day.  She is currently complaining of fatigue.  She denies chest pain, chest pressure, shortness of breath, palpitations or dizziness.\par She has no history of rheumatic fever.  She may drink 3 to 4 cups of coffee a day and may have 3 glasses of wine per week.\par She has no known cardiac risk factors.\par She reports that her mother had aortic stenosis and her maternal grandmother had a myocardial infarction.\par Electrocardiogram done [place] 15, 2022 demonstrated normal sinus rhythm at rate of 88 bpm is otherwise remarkable for left axis deviation.\par The patient remain out of work for the next 1 to 2 weeks convalescing and recovering from a myocardial infarction.\par She will have new blood work done today including lipoprotein a, lipoprotein B, lipid panel and SMA-20.\par She will gradually increase her activities of daily living.\par She will schedule an echo Doppler examination want to evaluate his left ventricular function, chamber size, murmur, rule out wall motion abnormalities and hypertrophy.\par A cardiac MRI was suggested at hospital discharge and the patient will discuss this with me at the time of the next visit.\par She is currently hemodynamically stable and asymptomatic from a cardiac standpoint.\par

## 2022-06-15 NOTE — REASON FOR VISIT
[CV Risk Factors and Non-Cardiac Disease] : CV risk factors and non-cardiac disease [FreeTextEntry3] : Dr. Lynch

## 2022-06-15 NOTE — PHYSICAL EXAM
[Well Developed] : well developed [Well Nourished] : well nourished [No Acute Distress] : no acute distress [Normal Conjunctiva] : normal conjunctiva [Normal Venous Pressure] : normal venous pressure [No Carotid Bruit] : no carotid bruit [Normal S1, S2] : normal S1, S2 [No Rub] : no rub [5th Left ICS - MCL] : palpated at the 5th LICS in the midclavicular line [Normal] : normal [No Precordial Heave] : no precordial heave was noted [Normal Rate] : normal [Rhythm Regular] : regular [Normal S1] : normal S1 [Normal S2] : normal S2 [No Gallop] : no gallop heard [II] : a grade 2 [No Pitting Edema] : no pitting edema present [2+] : left 2+ [No Abnormalities] : the abdominal aorta was not enlarged and no bruit was heard [Clear Lung Fields] : clear lung fields [Good Air Entry] : good air entry [No Respiratory Distress] : no respiratory distress  [Soft] : abdomen soft [Non Tender] : non-tender [No Masses/organomegaly] : no masses/organomegaly [Normal Bowel Sounds] : normal bowel sounds [Normal Gait] : normal gait [No Edema] : no edema [No Cyanosis] : no cyanosis [No Clubbing] : no clubbing [No Varicosities] : no varicosities [No Rash] : no rash [No Skin Lesions] : no skin lesions [Moves all extremities] : moves all extremities [No Focal Deficits] : no focal deficits [Normal Speech] : normal speech [Alert and Oriented] : alert and oriented [Normal memory] : normal memory [S3] : no S3 [S4] : no S4 [Click] : no click [Distant] : the heart sounds were ~L not distant [Pericardial Rub] : no pericardial rub [Right Carotid Bruit] : no bruit heard over the right carotid [Left Carotid Bruit] : no bruit heard over the left carotid [Right Femoral Bruit] : no bruit heard over the right femoral artery [Left Femoral Bruit] : no bruit heard over the left femoral artery

## 2022-06-20 RX ORDER — ASPIRIN 81 MG/1
81 TABLET ORAL
Qty: 90 | Refills: 1 | Status: ACTIVE | COMMUNITY
Start: 2022-06-20

## 2022-07-20 ENCOUNTER — APPOINTMENT (OUTPATIENT)
Dept: CARDIOLOGY | Facility: CLINIC | Age: 52
End: 2022-07-20

## 2022-07-20 VITALS
TEMPERATURE: 98 F | WEIGHT: 150 LBS | SYSTOLIC BLOOD PRESSURE: 125 MMHG | RESPIRATION RATE: 16 BRPM | DIASTOLIC BLOOD PRESSURE: 79 MMHG | HEART RATE: 90 BPM | HEIGHT: 64 IN | BODY MASS INDEX: 25.61 KG/M2 | OXYGEN SATURATION: 98 %

## 2022-07-20 DIAGNOSIS — R01.1 CARDIAC MURMUR, UNSPECIFIED: ICD-10-CM

## 2022-07-20 PROCEDURE — 99214 OFFICE O/P EST MOD 30 MIN: CPT

## 2022-07-20 RX ORDER — ATORVASTATIN CALCIUM 40 MG/1
40 TABLET, FILM COATED ORAL
Refills: 0 | Status: COMPLETED | COMMUNITY
End: 2022-07-20

## 2022-07-20 NOTE — DISCUSSION/SUMMARY
[FreeTextEntry1] : This is a 52-year-old female with past medical history significant for non-Q wave myocardial infarction in June 2022, who comes in for cardiac follow-up evaluation.  She denies chest pain, shortness of breath, dizziness or syncope.\par The patient is complaining of bilateral upper arm aches, and quadricep aches unrelated to any particular activity.  She feels this may be secondary to her Lipitor therapy.\par Blood work done [place] 15, 2022 demonstrated cholesterol 126, HDL 53, triglycerides 71, LDL direct 60 mg/dL and non-HDL cholesterol 73 mg/dL with a lipoprotein B of 59 mg/dL.\par The patient will discontinue her Lipitor 40 mg as of today.  She will contact me in 2 weeks and see if her symptoms resolve.  If her symptoms resolve I would rechallenge her with the Lipitor to see if there is return of her symptoms.  If she does have return of symptoms, I would consider switching her to Crestor therapy.\par Lifestyle modification including regular aerobic activity was reinforced.\par Prior to discharge, cardiology team recommend the patient have a cardiac MRI.  She will schedule this now to rule out structural heart disease, evaluate her for Takotsubo cardiomyopathy, and other abnormalities.\par She is currently hemodynamically stable and asymptomatic from a cardiac standpoint.  She is also encouraged to increase her hydration.\par \par PMH:\par The patient was well until June 8, 2022 when she woke at 3:30 in the morning with chest pressure, nausea and vomiting twice.  The pain did not subside and she went to [facility] where her cardiac enzymes were elevated, and she was transferred to [facility].\par Cardiac catheterization done [place] 10, 2022 demonstrated normal left main artery, normal left anterior descending artery, normal left circumflex artery, right coronary artery with minor irregularities, and a small diaphragmatic area of hypokinesis.\par The patient remained in the hospital 72 hours and reportedly had an echo Doppler examination done.\par It was suspect the patient could have had clot lysis, or coronary spasm.  She was under a lot of stress related to her sister's visit but did not have any specific emotional escalation, and Takotsubo cardiomyopathy is less likely.  She was discharged on Lipitor 40 mg daily and aspirin 81 mg/day.  glasses of wine per week.\par She has no known cardiac risk factors.\par She reports that her mother had aortic stenosis and her maternal grandmother had a myocardial infarction.\par Electrocardiogram done [place] 15, 2022 demonstrated normal sinus rhythm at rate of 88 bpm is otherwise remarkable for left axis deviation.\par She will gradually increase her activities of daily living.\par She is currently hemodynamically stable and asymptomatic from a cardiac standpoint.\par \par The patient understands that aerobic exercises must be increased to 40 minutes 4 times per week. A detailed discussion of lifestyle modification was done today. The patient has a good understanding of the diagnosis, and treatment plan. Lifestyle modification was also outlined.

## 2022-07-20 NOTE — PHYSICAL EXAM
[Well Developed] : well developed [Well Nourished] : well nourished [No Acute Distress] : no acute distress [Normal Conjunctiva] : normal conjunctiva [Normal Venous Pressure] : normal venous pressure [No Carotid Bruit] : no carotid bruit [Normal S1, S2] : normal S1, S2 [No Rub] : no rub [5th Left ICS - MCL] : palpated at the 5th LICS in the midclavicular line [Normal] : normal [No Precordial Heave] : no precordial heave was noted [Normal Rate] : normal [Rhythm Regular] : regular [Normal S1] : normal S1 [Normal S2] : normal S2 [No Gallop] : no gallop heard [II] : a grade 2 [No Pitting Edema] : no pitting edema present [2+] : left 2+ [No Abnormalities] : the abdominal aorta was not enlarged and no bruit was heard [Clear Lung Fields] : clear lung fields [Good Air Entry] : good air entry [No Respiratory Distress] : no respiratory distress  [Soft] : abdomen soft [Non Tender] : non-tender [No Masses/organomegaly] : no masses/organomegaly [Normal Bowel Sounds] : normal bowel sounds [Normal Gait] : normal gait [No Edema] : no edema [No Cyanosis] : no cyanosis [No Clubbing] : no clubbing [No Varicosities] : no varicosities [No Rash] : no rash [No Skin Lesions] : no skin lesions [Moves all extremities] : moves all extremities [No Focal Deficits] : no focal deficits [Normal Speech] : normal speech [Alert and Oriented] : alert and oriented [Normal memory] : normal memory [S3] : no S3 [S4] : no S4 [Click] : no click [Pericardial Rub] : no pericardial rub [Right Carotid Bruit] : no bruit heard over the right carotid [Left Carotid Bruit] : no bruit heard over the left carotid [Right Femoral Bruit] : no bruit heard over the right femoral artery [Left Femoral Bruit] : no bruit heard over the left femoral artery

## 2022-08-09 ENCOUNTER — OUTPATIENT (OUTPATIENT)
Dept: OUTPATIENT SERVICES | Facility: HOSPITAL | Age: 52
LOS: 1 days | End: 2022-08-09
Payer: COMMERCIAL

## 2022-08-09 ENCOUNTER — APPOINTMENT (OUTPATIENT)
Dept: MRI IMAGING | Facility: CLINIC | Age: 52
End: 2022-08-09

## 2022-08-09 DIAGNOSIS — Z00.8 ENCOUNTER FOR OTHER GENERAL EXAMINATION: ICD-10-CM

## 2022-08-09 DIAGNOSIS — I21.4 NON-ST ELEVATION (NSTEMI) MYOCARDIAL INFARCTION: ICD-10-CM

## 2022-08-09 DIAGNOSIS — I25.10 ATHEROSCLEROTIC HEART DISEASE OF NATIVE CORONARY ARTERY WITHOUT ANGINA PECTORIS: ICD-10-CM

## 2022-08-09 PROCEDURE — 75561 CARDIAC MRI FOR MORPH W/DYE: CPT | Mod: 26

## 2022-08-09 PROCEDURE — A9585: CPT

## 2022-08-09 PROCEDURE — 75561 CARDIAC MRI FOR MORPH W/DYE: CPT

## 2022-08-22 ENCOUNTER — NON-APPOINTMENT (OUTPATIENT)
Age: 52
End: 2022-08-22

## 2022-08-26 ENCOUNTER — NON-APPOINTMENT (OUTPATIENT)
Age: 52
End: 2022-08-26

## 2022-08-26 ENCOUNTER — APPOINTMENT (OUTPATIENT)
Dept: CARDIOLOGY | Facility: CLINIC | Age: 52
End: 2022-08-26

## 2022-08-26 VITALS
DIASTOLIC BLOOD PRESSURE: 75 MMHG | RESPIRATION RATE: 19 BRPM | SYSTOLIC BLOOD PRESSURE: 122 MMHG | TEMPERATURE: 97.2 F | HEIGHT: 64 IN | HEART RATE: 72 BPM | OXYGEN SATURATION: 98 % | WEIGHT: 150 LBS | BODY MASS INDEX: 25.61 KG/M2

## 2022-08-26 PROCEDURE — 99215 OFFICE O/P EST HI 40 MIN: CPT | Mod: 25

## 2022-08-26 PROCEDURE — 93000 ELECTROCARDIOGRAM COMPLETE: CPT

## 2022-08-26 NOTE — CARDIOLOGY SUMMARY
[de-identified] : August 26, 2022 sinus rhythm nonspecific ST-T change [de-identified] : 2022 [de-identified] : 2022 MRI small area of subendocardial enhancement inferior wall mid ventricular level [de-identified] : [place] 10, 2022 RCA plaque otherwise normal coronary arteriography

## 2022-08-26 NOTE — HISTORY OF PRESENT ILLNESS
[FreeTextEntry1] : Patient previously seen by me at [facility] with non-Q wave MI.  Transferred to [place].  Cardiac cath with plaque in the RCA, no thrombus.  No stent placed.  Has had further work-up including MRI showing inferior wall abnormality normal wall motion.\par \par Presents for opinion possible change in care.  She has been taking aspirin but has not continued on Lipitor thinking that she arm muscle aches and tingling and hair falling out but admits that this may have been psychosomatic.  Admits to having had a weight gain of 10 pounds over the next few years while working from home.\par \par She denies hypertension diabetes is a non-smoker.\par \par She denied chest pain dyspnea or palpitations.  She has been under emotional stress and lost her father in the past year.

## 2022-08-26 NOTE — ASSESSMENT
[FreeTextEntry1] : Status post non-Q infarction with new normal coronary arteriography normal LV function.

## 2022-08-26 NOTE — DISCUSSION/SUMMARY
[Patient] : the patient [Risks] : risks [Benefits] : benefits [Alternatives] : alternatives [___ Month(s)] : in [unfilled] month(s) [FreeTextEntry1] : Discussed findings and diagnosis with patient in detail questions addressed.  Recommended Mediterranean diet patient counseled and given handout.  Recommended cardiac rehab.  Discussed pros and cons of statins as well as alternate medication for lipids.  Importance of daily aspirin discussed.  Questions addressed.  Follow-up in  3 months

## 2022-09-27 ENCOUNTER — APPOINTMENT (OUTPATIENT)
Dept: CARDIOLOGY | Facility: CLINIC | Age: 52
End: 2022-09-27

## 2022-11-28 ENCOUNTER — APPOINTMENT (OUTPATIENT)
Dept: CARDIOLOGY | Facility: CLINIC | Age: 52
End: 2022-11-28

## 2022-11-28 VITALS
HEART RATE: 85 BPM | WEIGHT: 147 LBS | BODY MASS INDEX: 25.1 KG/M2 | RESPIRATION RATE: 16 BRPM | SYSTOLIC BLOOD PRESSURE: 121 MMHG | TEMPERATURE: 97.6 F | DIASTOLIC BLOOD PRESSURE: 81 MMHG | OXYGEN SATURATION: 97 % | HEIGHT: 64 IN

## 2022-11-28 PROCEDURE — 93000 ELECTROCARDIOGRAM COMPLETE: CPT

## 2022-11-28 PROCEDURE — 99214 OFFICE O/P EST MOD 30 MIN: CPT | Mod: 25

## 2022-11-28 NOTE — CARDIOLOGY SUMMARY
[de-identified] : August 26, 2022 sinus rhythm nonspecific ST-T change\par November 28, 2022 [de-identified] : 2022 [de-identified] : 2022 MRI small area of subendocardial enhancement inferior wall mid ventricular level [de-identified] : [place] 10, 2022 RCA plaque otherwise normal coronary arteriography

## 2022-11-28 NOTE — HISTORY OF PRESENT ILLNESS
[FreeTextEntry1] : Still hasPatient previously seen by me at [facility] with non-Q wave MI.  Transferred to [place].  Cardiac cath with plaque in the RCA, no thrombus.  No stent placed.  Has had further work-up including MRI showing inferior wall abnormality normal wall motion.\par \par No exertional chest pain dyspnea or palpitations.  Is tolerating pravastatin without problem screen.  Gets little discomfort in the left lateral chest over the breast when emotionally upset.

## 2022-11-28 NOTE — DISCUSSION/SUMMARY
[Patient] : the patient [Risks] : risks [Benefits] : benefits [Alternatives] : alternatives [___ Month(s)] : in [unfilled] month(s) [FreeTextEntry1] : Precautions in cold weather discussed possibility of coronary spasm discussed.  Blood work ordered.  Cardiac rehab referral.  Questions addressed with patient.  Continue aspirin and pravastatin follow-up in 3 to 4 months [EKG obtained to assist in diagnosis and management of assessed problem(s)] : EKG obtained to assist in diagnosis and management of assessed problem(s)

## 2022-12-15 LAB
ALBUMIN SERPL ELPH-MCNC: 4.4 G/DL
ALP BLD-CCNC: 60 U/L
ALT SERPL-CCNC: 12 U/L
ANION GAP SERPL CALC-SCNC: 10 MMOL/L
AST SERPL-CCNC: 14 U/L
BASOPHILS # BLD AUTO: 0.06 K/UL
BASOPHILS NFR BLD AUTO: 1.1 %
BILIRUB SERPL-MCNC: 0.3 MG/DL
BUN SERPL-MCNC: 16 MG/DL
CALCIUM SERPL-MCNC: 9.1 MG/DL
CHLORIDE SERPL-SCNC: 107 MMOL/L
CHOLEST SERPL-MCNC: 156 MG/DL
CO2 SERPL-SCNC: 25 MMOL/L
CREAT SERPL-MCNC: 0.72 MG/DL
CRP SERPL HS-MCNC: 0.94 MG/L
EGFR: 101 ML/MIN/1.73M2
EOSINOPHIL # BLD AUTO: 0.14 K/UL
EOSINOPHIL NFR BLD AUTO: 2.6 %
GLUCOSE SERPL-MCNC: 86 MG/DL
HCT VFR BLD CALC: 38.6 %
HDLC SERPL-MCNC: 60 MG/DL
HGB BLD-MCNC: 12.3 G/DL
IMM GRANULOCYTES NFR BLD AUTO: 0.2 %
LDLC SERPL CALC-MCNC: 84 MG/DL
LYMPHOCYTES # BLD AUTO: 1.6 K/UL
LYMPHOCYTES NFR BLD AUTO: 30.1 %
MAN DIFF?: NORMAL
MCHC RBC-ENTMCNC: 28.3 PG
MCHC RBC-ENTMCNC: 31.9 GM/DL
MCV RBC AUTO: 88.9 FL
MONOCYTES # BLD AUTO: 0.49 K/UL
MONOCYTES NFR BLD AUTO: 9.2 %
NEUTROPHILS # BLD AUTO: 3.01 K/UL
NEUTROPHILS NFR BLD AUTO: 56.8 %
NONHDLC SERPL-MCNC: 95 MG/DL
PLATELET # BLD AUTO: 220 K/UL
POTASSIUM SERPL-SCNC: 4.2 MMOL/L
PROT SERPL-MCNC: 7.2 G/DL
RBC # BLD: 4.34 M/UL
RBC # FLD: 12.8 %
SODIUM SERPL-SCNC: 142 MMOL/L
TRIGL SERPL-MCNC: 57 MG/DL
TSH SERPL-ACNC: 1.65 UIU/ML
WBC # FLD AUTO: 5.31 K/UL

## 2022-12-15 NOTE — PATIENT PROFILE ADULT - FALL HARM RISK - FALLEN IN PAST
RAFAEL ROMEO   is a   69   year old male who is being seen in consultation at the request of   SERENA CH   for gas/reflux. He was having reflux and was put on omeprazole which seemed to be working well. However then he was put on lexapro but had to stop omeprazole due to medication interaction. He was switched to pantoprazole which has not helped as much. He has noted increased gas and belching since making the switch. His reflux is not under control. Over the past 5-6 days he has had diarrhea with urgency. He initially had mixed BMs, some more solid than others. However diarrhea has progressed. He has had episodes of fecal incontinence/accidents. No new medications, pantoprazole was started before thanksgiving. No recent travel. No sick contacts. COVID negative. No outside meals.  He denies any melena or rectal bleeding. He denies any abdominal pain, nausea, vomiting.   br
br He had diverticulitis in 2018 with perforation. He had an emergency surgery with Dr. Paige and had a partial resection of bowel. Last colonoscopy was in 2020 at [facility]. No prior EGD.  
No

## 2023-02-27 ENCOUNTER — APPOINTMENT (OUTPATIENT)
Dept: CARDIOLOGY | Facility: CLINIC | Age: 53
End: 2023-02-27
Payer: COMMERCIAL

## 2023-02-27 ENCOUNTER — NON-APPOINTMENT (OUTPATIENT)
Age: 53
End: 2023-02-27

## 2023-02-27 VITALS
WEIGHT: 150 LBS | HEART RATE: 76 BPM | TEMPERATURE: 97.8 F | BODY MASS INDEX: 25.61 KG/M2 | HEIGHT: 64 IN | SYSTOLIC BLOOD PRESSURE: 121 MMHG | RESPIRATION RATE: 17 BRPM | DIASTOLIC BLOOD PRESSURE: 82 MMHG | OXYGEN SATURATION: 98 %

## 2023-02-27 DIAGNOSIS — Z78.9 OTHER SPECIFIED HEALTH STATUS: ICD-10-CM

## 2023-02-27 DIAGNOSIS — I21.4 NON-ST ELEVATION (NSTEMI) MYOCARDIAL INFARCTION: ICD-10-CM

## 2023-02-27 PROCEDURE — 93000 ELECTROCARDIOGRAM COMPLETE: CPT

## 2023-02-27 PROCEDURE — 99214 OFFICE O/P EST MOD 30 MIN: CPT | Mod: 25

## 2023-02-27 NOTE — HISTORY OF PRESENT ILLNESS
[FreeTextEntry1] : Still hasPatient previously seen by me at [facility] with non-Q wave MI.  Transferred to [place].  Cardiac cath with plaque in the RCA, no thrombus.  No stent placed.  Has had further work-up including MRI showing inferior wall abnormality normal wall motion.\par \par No exertional chest pain dyspnea or palpitations.  Is tolerating pravastatin without problem screen.  Gets little discomfort in the left lateral chest over the breast when emotionally upset.\par \par Due to scheduling issues unable to arrange cardiac rehab but remains active moderately.

## 2023-02-27 NOTE — ASSESSMENT
[FreeTextEntry1] : Status post non-Q infarction with new normal coronary arteriography normal LV function. ([facility])

## 2023-02-27 NOTE — CARDIOLOGY SUMMARY
[de-identified] : August 26, 2022 sinus rhythm nonspecific ST-T change\par November 28, 2022\par February 27, 2023 sinus rhythm [de-identified] : 2022 [de-identified] : 2022 MRI small area of subendocardial enhancement inferior wall mid ventricular level [de-identified] : [place] 10, 2022 RCA plaque otherwise normal coronary arteriography

## 2023-02-27 NOTE — DISCUSSION/SUMMARY
[Patient] : the patient [Risks] : risks [Benefits] : benefits [Alternatives] : alternatives [___ Month(s)] : in [unfilled] month(s) [FreeTextEntry1] : Precautions in cold weather discussed possibility of coronary spasm discussed.  Blood work reviewed. Cardiac rehab referral.  Questions addressed with patient.  Continue aspirin and pravastatin follow-up in 3 to 4 months [EKG obtained to assist in diagnosis and management of assessed problem(s)] : EKG obtained to assist in diagnosis and management of assessed problem(s)

## 2023-06-19 ENCOUNTER — APPOINTMENT (OUTPATIENT)
Dept: CARDIOLOGY | Facility: CLINIC | Age: 53
End: 2023-06-19
Payer: COMMERCIAL

## 2023-06-19 ENCOUNTER — NON-APPOINTMENT (OUTPATIENT)
Age: 53
End: 2023-06-19

## 2023-06-19 VITALS
OXYGEN SATURATION: 97 % | SYSTOLIC BLOOD PRESSURE: 118 MMHG | BODY MASS INDEX: 25.95 KG/M2 | HEIGHT: 64 IN | WEIGHT: 152 LBS | HEART RATE: 81 BPM | DIASTOLIC BLOOD PRESSURE: 82 MMHG

## 2023-06-19 PROCEDURE — 99214 OFFICE O/P EST MOD 30 MIN: CPT | Mod: 25

## 2023-06-19 PROCEDURE — 93000 ELECTROCARDIOGRAM COMPLETE: CPT

## 2023-06-19 RX ORDER — PRAVASTATIN SODIUM 20 MG/1
20 TABLET ORAL
Qty: 1 | Refills: 5 | Status: ACTIVE | COMMUNITY
Start: 2022-08-26 | End: 1900-01-01

## 2023-06-19 NOTE — DISCUSSION/SUMMARY
[Patient] : the patient [Risks] : risks [Benefits] : benefits [Alternatives] : alternatives [___ Month(s)] : in [unfilled] month(s) [FreeTextEntry1] : Questions addressed with patient.  Continue aspirin and pravastatin lab testing prior to next visit as ordered and follow-up in 4 to 6 months or earlier as needed.  Questions addressed with patient. [EKG obtained to assist in diagnosis and management of assessed problem(s)] : EKG obtained to assist in diagnosis and management of assessed problem(s)

## 2023-06-19 NOTE — CARDIOLOGY SUMMARY
[de-identified] : August 26, 2022 sinus rhythm nonspecific ST-T change\par November 28, 2022\par February 27, 2023 sinus rhythm\par June 19, 2023 sinus rhythm [de-identified] : 2022 [de-identified] : 2022 MRI small area of subendocardial enhancement inferior wall mid ventricular level [de-identified] : [place] 10, 2022 RCA plaque otherwise normal coronary arteriography

## 2023-06-19 NOTE — PHYSICAL EXAM
[Well Developed] : well developed [Well Nourished] : well nourished [No Acute Distress] : no acute distress [Normal Conjunctiva] : normal conjunctiva [Normal Venous Pressure] : normal venous pressure [No Carotid Bruit] : no carotid bruit [Normal S1, S2] : normal S1, S2 [No Murmur] : no murmur [No Rub] : no rub [No Gallop] : no gallop [Clear Lung Fields] : clear lung fields [Good Air Entry] : good air entry [No Respiratory Distress] : no respiratory distress  [Soft] : abdomen soft [Non Tender] : non-tender [No Masses/organomegaly] : no masses/organomegaly [Normal Gait] : normal gait [Normal Bowel Sounds] : normal bowel sounds [No Edema] : no edema [No Cyanosis] : no cyanosis [No Clubbing] : no clubbing [No Rash] : no rash [No Skin Lesions] : no skin lesions [Moves all extremities] : moves all extremities [No Focal Deficits] : no focal deficits [Normal Speech] : normal speech [Alert and Oriented] : alert and oriented [Normal memory] : normal memory

## 2023-06-19 NOTE — HISTORY OF PRESENT ILLNESS
[FreeTextEntry1] : History of  non-Q wave MI.  Transferred to [place].  Cardiac cath with plaque in the RCA, no thrombus.  No stent placed.  Has had further work-up including MRI showing inferior wall abnormality normal wall motion.\par \par No exertional chest pain dyspnea or palpitations.  Is tolerating pravastatin without problem.  Admits to suboptimal diet.  \par

## 2023-12-18 ENCOUNTER — APPOINTMENT (OUTPATIENT)
Dept: CARDIOLOGY | Facility: CLINIC | Age: 53
End: 2023-12-18

## 2024-03-18 ENCOUNTER — APPOINTMENT (OUTPATIENT)
Dept: CARDIOLOGY | Facility: CLINIC | Age: 54
End: 2024-03-18

## 2024-03-18 ENCOUNTER — APPOINTMENT (OUTPATIENT)
Dept: CARDIOLOGY | Facility: CLINIC | Age: 54
End: 2024-03-18
Payer: COMMERCIAL

## 2024-03-18 ENCOUNTER — NON-APPOINTMENT (OUTPATIENT)
Age: 54
End: 2024-03-18

## 2024-03-18 VITALS
OXYGEN SATURATION: 95 % | HEIGHT: 64 IN | DIASTOLIC BLOOD PRESSURE: 85 MMHG | SYSTOLIC BLOOD PRESSURE: 124 MMHG | WEIGHT: 159 LBS | HEART RATE: 81 BPM | BODY MASS INDEX: 27.14 KG/M2

## 2024-03-18 DIAGNOSIS — R00.2 PALPITATIONS: ICD-10-CM

## 2024-03-18 PROCEDURE — 93228 REMOTE 30 DAY ECG REV/REPORT: CPT

## 2024-03-18 PROCEDURE — 99215 OFFICE O/P EST HI 40 MIN: CPT | Mod: 25

## 2024-03-18 PROCEDURE — 93000 ELECTROCARDIOGRAM COMPLETE: CPT | Mod: XE

## 2024-03-18 NOTE — HISTORY OF PRESENT ILLNESS
[FreeTextEntry1] : History of  non-Q wave MI.  Transferred to [place].  Cardiac cath with plaque in the RCA, no thrombus.  No stent placed.  Has had further work-up including MRI showing inferior wall abnormality normal wall motion.  Today with complaints of palpitations feeling fluttering in her chest that lasts up to 15 or 20 seconds happening 5 times in the past few months unclear precipitant.  No lightheadedness shortness of breath dizziness associated yesterday after episode a little nauseated.  Drinks 3 cups of coffee daily.  No chest pain or shortness of breath.

## 2024-03-18 NOTE — PHYSICAL EXAM
[Well Developed] : well developed [Well Nourished] : well nourished [No Acute Distress] : no acute distress [Normal Venous Pressure] : normal venous pressure [Normal Conjunctiva] : normal conjunctiva [No Carotid Bruit] : no carotid bruit [No Murmur] : no murmur [Normal S1, S2] : normal S1, S2 [No Rub] : no rub [No Gallop] : no gallop [Good Air Entry] : good air entry [Clear Lung Fields] : clear lung fields [No Respiratory Distress] : no respiratory distress  [Soft] : abdomen soft [Non Tender] : non-tender [No Masses/organomegaly] : no masses/organomegaly [Normal Bowel Sounds] : normal bowel sounds [No Edema] : no edema [Normal Gait] : normal gait [No Cyanosis] : no cyanosis [No Clubbing] : no clubbing [Moves all extremities] : moves all extremities [Normal Speech] : normal speech [No Focal Deficits] : no focal deficits [Alert and Oriented] : alert and oriented [Normal memory] : normal memory

## 2024-03-18 NOTE — CARDIOLOGY SUMMARY
[de-identified] : 2022 [de-identified] : August 26, 2022 sinus rhythm nonspecific ST-T change November 28, 2022 February 27, 2023 sinus rhythm March 18, 2024 sinus rhythm June 19, 2023 sinus rhythm [de-identified] : [place] 10, 2022 RCA plaque otherwise normal coronary arteriography [de-identified] : 2022 MRI small area of subendocardial enhancement inferior wall mid ventricular level

## 2024-03-18 NOTE — ASSESSMENT
[FreeTextEntry1] : Status post non-Q infarction with new normal coronary arteriography normal LV function. ([facility]) New symptoms of palpitations fluttering

## 2024-03-18 NOTE — DISCUSSION/SUMMARY
[Patient] : the patient [Risks] : risks [Alternatives] : alternatives [Benefits] : benefits [___ Month(s)] : in [unfilled] month(s) [FreeTextEntry1] : Questions addressed with patient.  Continue aspirin and pravastatin lab testing prior to next visit as ordered and follow-up after event recorder.  Reduce caffeine intake.  Labs ordered call for results.  Discussed with patient and  [EKG obtained to assist in diagnosis and management of assessed problem(s)] : EKG obtained to assist in diagnosis and management of assessed problem(s)

## 2024-05-02 ENCOUNTER — APPOINTMENT (OUTPATIENT)
Dept: CARDIOLOGY | Facility: CLINIC | Age: 54
End: 2024-05-02
Payer: COMMERCIAL

## 2024-05-02 ENCOUNTER — NON-APPOINTMENT (OUTPATIENT)
Age: 54
End: 2024-05-02

## 2024-05-02 VITALS
HEIGHT: 64 IN | DIASTOLIC BLOOD PRESSURE: 82 MMHG | BODY MASS INDEX: 27.31 KG/M2 | OXYGEN SATURATION: 97 % | HEART RATE: 86 BPM | WEIGHT: 160 LBS | SYSTOLIC BLOOD PRESSURE: 117 MMHG

## 2024-05-02 DIAGNOSIS — Z00.00 ENCOUNTER FOR GENERAL ADULT MEDICAL EXAMINATION W/OUT ABNORMAL FINDINGS: ICD-10-CM

## 2024-05-02 DIAGNOSIS — E78.5 HYPERLIPIDEMIA, UNSPECIFIED: ICD-10-CM

## 2024-05-02 DIAGNOSIS — I25.10 ATHEROSCLEROTIC HEART DISEASE OF NATIVE CORONARY ARTERY W/OUT ANGINA PECTORIS: ICD-10-CM

## 2024-05-02 PROCEDURE — 93000 ELECTROCARDIOGRAM COMPLETE: CPT

## 2024-05-02 PROCEDURE — G2211 COMPLEX E/M VISIT ADD ON: CPT

## 2024-05-02 PROCEDURE — 99214 OFFICE O/P EST MOD 30 MIN: CPT

## 2024-05-02 NOTE — ASSESSMENT
[FreeTextEntry1] : Status post non-Q infarction with new normal coronary arteriography normal LV function. ([facility]) New symptoms of palpitations fluttering with cardiac monitoring for me SVT with RVR

## 2024-05-02 NOTE — CARDIOLOGY SUMMARY
[de-identified] : August 26, 2022 sinus rhythm nonspecific ST-T change November 28, 2022 February 27, 2023 sinus rhythm  March 18, 2024 sinus rhythm June 19, 2023 sinus rhythm [de-identified] : April 2020 for SVT with RVR [de-identified] : 2022 [de-identified] : 2022 MRI small area of subendocardial enhancement inferior wall mid ventricular level [de-identified] : [place] 10, 2022 RCA plaque otherwise normal coronary arteriography

## 2024-05-02 NOTE — DISCUSSION/SUMMARY
[Patient] : the patient [Risks] : risks [Benefits] : benefits [Alternatives] : alternatives [___ Month(s)] : in [unfilled] month(s) [FreeTextEntry1] : Discussed in detail with patient and  will initiate low-dose beta-blocker potential side effects discussed.  EP referral for consideration to ablation.  Lab testing requested.  Vagal maneuvers discussed.  ER visit for prolonged episodes that do not resolve  [EKG obtained to assist in diagnosis and management of assessed problem(s)] : EKG obtained to assist in diagnosis and management of assessed problem(s)

## 2024-05-02 NOTE — HISTORY OF PRESENT ILLNESS
[FreeTextEntry1] : History of  non-Q wave MI.  Transferred to [place].  Cardiac cath with plaque in the RCA, no thrombus.  No stent placed.  Has had further work-up including MRI showing inferior wall abnormality normal wall motion.  Seen today for follow-up of palpitations arrhythmia.  Had cardiac monitor showing long episode of SVT with rapid rates.  She had no significant lightheadedness dizziness chest pain or shortness of breath associated but felt off.  Episode self resolved.  She gets these episodes about once a month.

## 2024-05-20 ENCOUNTER — APPOINTMENT (OUTPATIENT)
Dept: ENDOCRINOLOGY | Facility: CLINIC | Age: 54
End: 2024-05-20
Payer: COMMERCIAL

## 2024-05-20 VITALS
WEIGHT: 160 LBS | OXYGEN SATURATION: 98 % | HEART RATE: 83 BPM | TEMPERATURE: 97 F | RESPIRATION RATE: 15 BRPM | DIASTOLIC BLOOD PRESSURE: 68 MMHG | HEIGHT: 64 IN | BODY MASS INDEX: 27.31 KG/M2 | SYSTOLIC BLOOD PRESSURE: 112 MMHG

## 2024-05-20 DIAGNOSIS — Z83.49 FAMILY HISTORY OF OTHER ENDOCRINE, NUTRITIONAL AND METABOLIC DISEASES: ICD-10-CM

## 2024-05-20 LAB
ALBUMIN SERPL ELPH-MCNC: 4.4 G/DL
ALP BLD-CCNC: 62 U/L
ALT SERPL-CCNC: 26 U/L
ANION GAP SERPL CALC-SCNC: 9 MMOL/L
AST SERPL-CCNC: 21 U/L
BASOPHILS # BLD AUTO: 0.05 K/UL
BASOPHILS NFR BLD AUTO: 1 %
BILIRUB SERPL-MCNC: 0.3 MG/DL
BUN SERPL-MCNC: 14 MG/DL
CALCIUM SERPL-MCNC: 9.6 MG/DL
CHLORIDE SERPL-SCNC: 107 MMOL/L
CHOLEST SERPL-MCNC: 150 MG/DL
CO2 SERPL-SCNC: 25 MMOL/L
CREAT SERPL-MCNC: 0.62 MG/DL
CRP SERPL HS-MCNC: 0.91 MG/L
EGFR: 106 ML/MIN/1.73M2
EOSINOPHIL # BLD AUTO: 0.2 K/UL
EOSINOPHIL NFR BLD AUTO: 4.1 %
GLUCOSE SERPL-MCNC: 93 MG/DL
HCT VFR BLD CALC: 39.6 %
HDLC SERPL-MCNC: 55 MG/DL
HGB BLD-MCNC: 12.9 G/DL
IMM GRANULOCYTES NFR BLD AUTO: 0.2 %
LDLC SERPL CALC-MCNC: 84 MG/DL
LYMPHOCYTES # BLD AUTO: 1.46 K/UL
LYMPHOCYTES NFR BLD AUTO: 29.9 %
MAN DIFF?: NORMAL
MCHC RBC-ENTMCNC: 27.9 PG
MCHC RBC-ENTMCNC: 32.6 GM/DL
MCV RBC AUTO: 85.7 FL
MONOCYTES # BLD AUTO: 0.38 K/UL
MONOCYTES NFR BLD AUTO: 7.8 %
NEUTROPHILS # BLD AUTO: 2.79 K/UL
NEUTROPHILS NFR BLD AUTO: 57 %
NONHDLC SERPL-MCNC: 95 MG/DL
PLATELET # BLD AUTO: 257 K/UL
POTASSIUM SERPL-SCNC: 4.3 MMOL/L
PROT SERPL-MCNC: 6.9 G/DL
RBC # BLD: 4.62 M/UL
RBC # FLD: 12.6 %
SODIUM SERPL-SCNC: 141 MMOL/L
T3FREE SERPL-MCNC: 5.15 PG/ML
T4 FREE SERPL-MCNC: 1.8 NG/DL
TRIGL SERPL-MCNC: 56 MG/DL
TSH SERPL-ACNC: <0.01 UIU/ML
WBC # FLD AUTO: 4.89 K/UL

## 2024-05-20 PROCEDURE — 99204 OFFICE O/P NEW MOD 45 MIN: CPT

## 2024-05-20 NOTE — PHYSICAL EXAM
[de-identified] : General: No distress, well nourished Eyes: Normal Sclera, EOMI, PERRL ENT: Normal appearance of the nose, normal oropharynx Neck/Thyroid: No cervical lymphadenopathy, thyroid gland 20 g in size, no thyroid nodules, non-tender Respiratory: No use of accessory muscles of respiration, vesicular breath sounds heard bilaterally, no crepitations or ronchi Cardiovascular: S1 and S2 heard and normal, no S3 or S4, no murmurs, radial pulse normal bilaterally Abdomen: soft, non-tender, no masses, normal bowel sounds Musculoskeletal: No swelling or deformities of joints of hands, no pedal edema Neurological: Normal range of motion in the hands, Normal brachioradialis reflexes bilaterally Psychiatry: Patient converses normally, good judgement and insight Skin: No rashes in hands, no nodules palpated in hands

## 2024-05-20 NOTE — HISTORY OF PRESENT ILLNESS
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 She noticed that her symptoms have worsened after the passing of her  04/09/2020. Upon inspection of her anal region, she noticed a firm ridge that extended up into the anal canal. She sought medical evaluation in [facility], where she had an endoscopic evaluation, which was consistent with an anal mass. - Surgical pathology was consistent with poorly differentiated squamous cell carcinoma which was P16 positive. Due to insurance issues, she moved closer to [facility] in the [facility] and is now referred for evaluation of further management. - She underwent CT CAP on 06/15/2020 in [place] which was negative for metastatic disease.   - On 08/06/2020 she underwent endoscopic ultrasound evaluation by Dr. Machelle Sampson a 3.5 cm lesion consistent with a T2 primary. There are multiple pathologic perirectal lymph nodes measuring between 6 and 11 mm in maximal diameter. A total of 5 pathologic lymph nodes were identified in the perirectal region consistent with N1a disease. The mass did extend across the anal canal and disrupted the internal IX and external anal sphincters anteriorly. - She underwent PET scan on 08/05/2020 here which revealed a large hypermetabolic mass involving the anal canal and lower rectum. It potentially could measure up to 5.2 cm however exact measurement of tumor size can be suboptimal on PET scan. She also had hypermetabolic metastatic lymphadenopathy in the left inguinal region and the left mesial rectal region suggesting N1a disease.  There was mixed interstitial and ground-glass [FreeTextEntry1] : Problems: 1. Overt thyrotoxicosis  Overt thyrotoxicosis 1. Patient was diagnosed with overt thyrotoxicosis in May 2024 2. Labs: June 2022 - TSH N Dec 2022 - TSH N 05/20/2024 - TSH < 0.01 (0.27 to 4.2), Free T4 - 1.8 (0.9 to 1.8), Free T3 - 5.15 (2 to 4.4) 3. Mother had hypothyroidism due to Hashimoto's thyroiditis, no family history of thyroid cancer, no personal history of radiation treatment 4. Cardiac disorders - Had MI in past (has CAD), has SVT 5. Symptoms on 05/20/2024 - no weight loss, had palpitations, no hand tremor, no diaphoresis, has insomnia 6. No Graves' orbitopathy or dermopathy  7. Meds: Patient is not on estrogen Metoprolol ER 25 mg po daily - this is prescribed by Cardiology for her SVT. densities seen throughout the lungs associated with low-grade background activity suggesting an infectious or inflammatory etiology. There was a focal oblong area of FDG activity noted anteriorly within the vagina which most likely represents urinary contamination.   - PORT was placed 8/13/2020.  - Underwent ECHO on 8/17/2020 which showed EJ 65%. - Underwent chemoradiation from 8/20/2020- 10/7/2020.   - Patient presented to the ED on 9/26 with diarrhea and vomiting. She was treated with IV fluids and Zofran, she was then admitted to the hospital with supportive care. C. Difficile tested and was negative. - Chemotherapy with Flurouracil and Mitomycin completed on 10/8. Completed radiation, on 10/7, received 54 Gy over a total of 30 fractions. - CT CAP from 11/16/2020 showed no evidence of metastatic disease. Hypermetabolic mass in the rectum and anus seen on PET CT is not well appreciated on exam. Perirectal lymph nodes have decreased in size, no new adenopathy in the pelvis. Stable chronic ground glass opacity in the lungs was also seen. - Initiated clinical trial ([id number]) treatments with adjuvant Nivolumab on 11/24/2020. Tolerating well so far. TREATMENT INTENT:   - Curative    DISEASE RESPONSE MEASURES:   - H&P, imaging    OTHER CONSULTANTS:  - Aniket Joseph MD ([facility] [place])  - Radha Paulino MD (GI)    OTHER MEDICAL PROBLEMS:   Past Medical History:   Diagnosis Date   â¢ Anxiety disorder    â¢ Arthritis    â¢ Cervical cancer (CMS/HCC) age 23   â¢ COPD (chronic obstructive pulmonary disease) (CMS/HCC)    â¢ Depressive disorder    â¢ Malignant neoplasm (CMS/HCC)    â¢ PONV (postoperative nausea and vomiting)    â¢ PTSD (post-traumatic stress disorder)    â¢ Rectal carcinoma (CMS/HCC)    â¢ Tachycardia    â¢ Thyroid condition    â¢ Vitamin D deficiency          MEDICATIONS AND ALLERGIES:    Medications and allergies were reviewed and updated.   Current Outpatient Medications   Medication Sig Dispense Refill   â¢ ALPRAZolam (XANAX) 0.25 MG tablet Take 1 tablet by mouth 3 times daily as needed for Sleep. 90 tablet 0   â¢ venlafaxine XR (EFFEXOR XR) 75 MG 24 hr capsule Take 1 capsule by mouth daily. 90 capsule 4   â¢ terbinafine (LamISIL) 250 MG tablet Take 1 tablet by mouth daily. 30 tablet 2   â¢ albuterol 108 (90 Base) MCG/ACT inhaler Inhale 2 puffs into the lungs every 4 hours as needed for Shortness of Breath or Wheezing. 1 Inhaler 6   â¢ omeprazole (PriLOSEC) 40 MG capsule Take 1 capsule by mouth daily. 30 capsule 11   â¢ levothyroxine 125 MCG tablet Take 125 mcg by mouth daily. â¢ ciclopirox (Penlac) 8 % topical solution Apply topically daily. Apply to affected area one time a day. 6.6 mL 3   â¢ clotrimazole-betamethasone (LOTRISONE) 1-0.05 % cream Apply to the affected area on the foot bid. 30 g 3   â¢ benzonatate (TESSALON PERLES) 100 MG capsule Take 1 capsule by mouth 3 times daily as needed for Cough. 30 capsule 3     No current facility-administered medications for this visit. ALLERGIES:  No Known Allergies        INTERVAL HISTORY:  Ms. Wannetta Seip is a 48year old female with anal canal squamous cell carcinoma presents to the clinic today for a follow up visit prior to scheduled treatment. In the interim, patient presented to the ED on 1/27 with diarrhea, vomiting, and nausea that had been occurring for 4 days. Imaging was performed with no acute findings. Treated with supportive IV fluids and anti emetics. She is overall doing well. Patient endorses abdominal pain, described as a dull ache. Stools are soft. Reports a couple episodes of bleeding after bowel movements and she noticed mucous with bowels. Patient reports occasional incontinence of the stool, occurs a couple times a week. Denies constipation, diarrhea, nausea, vomiting, fevers, coughing, shortness of breath, headaches, urinary symptoms, and vaginal bleeding/spotting. Patient hasn't had menstrual cycles since the start of treatments.  She experiences lightheadedness/dizziness with positional changes, she continues to stay well hydrated. She is compliant with levothyroxine. Appetite is stable. REVIEW OF SYSTEMS:  Reviewed and verified per nursing assessment as noted below:  Nausea: NO  Vomiting: NO  Fever: NO  Chills: NO  Other signs of infection: NO  Bleeding: NO; bruising past 1 week (arms/legs)  Mucositis: NO  Diarrhea: NO  Constipation: NO  Anorexia: NO  Dysuria: NO  Urinary Bleeding: NO  Cough: NO  Shortness of Breath: NO  Fatigue/Weakness: YES; intermittent  Numbness/Tingling: NO  Other Neuropathies: NO  Edema: NO  Rash: NO  Hand/Foot Syndrome: NO  Anxiety/Depression/Insomnia: YES, ongoing  Pain: YES, Pain Rating:  3, Location: rectal pain, Intervention: since 2/15 (bleeding x1 on 2/15)      HISTORIES:  Past medical history, surgical history, family history, and social history were reviewed and updated. Past Surgical History:   Procedure Laterality Date   â¢ Abdomen surgery     â¢ Appendectomy     â¢ Breast surgery     â¢ Cholecystectomy     â¢ Colonoscopy w/ polypectomy  08/06/2020    repeat in 3 years per Dr. Chino Lugo - hx: polyps and anal cancer   â¢ Cyst removal      baker's cyst   â¢ Insertion of access port Left 08/2020   â¢ Screening pap smear by phys  2016     Family History   Problem Relation Age of Onset   â¢ Cancer, Colon Mother    â¢ Cancer Mother         Cervical   â¢ Cancer, Lung Father    â¢ Dementia/Alzheimers Maternal Grandfather      Social History     Tobacco Use   â¢ Smoking status: Current Every Day Smoker     Packs/day: 0.25     Years: 15.00     Pack years: 3.75     Types: Cigarettes   â¢ Smokeless tobacco: Never Used   â¢ Tobacco comment: Trying to quit.    Substance Use Topics   â¢ Alcohol use: Not Currently   â¢ Drug use: Not Currently         PHYSICAL EXAMINATION:    VITALS:   Vitals:    02/16/21 1300   BP: 106/72   BP Location: RUE - Right upper extremity   Patient Position: Sitting   Cuff Size: Large Adult   Pulse: (!) 104   Resp: 18   Temp: "98.6 Â°F (37 Â°C)   TempSrc: Oral   SpO2: 97%   Weight: 93.5 kg   Height: 5' 5"" (1.651 m)   PainSc: 3-4       ECO - Fully active, able to carry on all pre-disease activities without restrictions. General: Patient appears comfortable and in no acute distress. HEENT: Head is atraumatic, normocephalic. No scleral icterus. Conjunctiva are without injection. Respiratory: Normal respiratory effort. Good bilateral air entry. No wheezes or crackles. Cardiovascular: Regular rate and rhythm. Normal S1 and S2. No murmurs. Abdomen: Soft. Mild tenderness to palpation of the left lower quadrant. No masses appreciated. Skin: No skin rashes. Neurologic: No focal deficits. Musculoskeletal: Gait is normal.   Psychiatric: The patient is alert and oriented times 3. Affect is not blunted and mood is appropriate. Limited physical examination was performed in light of social distancing due to COVID-[employer] protocols. LABS:  Recent Labs   Lab 21  1349   WBC 8.0   RBC 5.16   HGB 15.3   HCT 45.8   MCV 88.8      Absolute Neutrophils 6.3     Recent Labs   Lab 21  1348 21  1155  20  0856   Sodium 140 139   < >  --    Potassium 3.9 3.7   < >  --    Chloride 106 106   < >  --    BUN 8 12   < >  --    Creatinine 0.70 0.76   < >  --    Glucose 115* 114*   < >  --    Calcium 9.1 9.8   < >  --    Albumin 3.6 4.1   < >  --    Globulin 3.6 4.0   < >  --    Bilirubin, Total 0.3 0.4   < >  --    GOT/AST 10 9   < >  --    GPT 19 19   < >  --    LD, Total  --   --   --  218   Magnesium  --  1.8   < >  --     < > = values in this interval not displayed. Pathology:  None pertinent to today's visit. Imaging:  None pertinent to today's visit. ASSESSMENT AND PLAN:  Ms. Graham Mcdonald is a 48year old female with the following hematology/oncology problems:    1.  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 Did struggle with nausea and dehydration after 2nd cycle of chemotherapy  - Post treatment imaging with excellent response to treatment  - She is currently receiving immunotherapy on a clinical trial ([id number]). She was randomized to the Nivolumab arm. Treatments every 4 weeks x 6 cycles. Repeat imaging will be performed after completion of Cycle 6 as long as she is doing well clinically. - Patient follows with Dr. Julia Matos for rectal exams. Anoscopy will be performed to assess disease response. Rectal exam performed on 1/20 with no concerning findings. Plans for rectal exams q 3 months.   - Scheduled for cycle 4 of Nivolumab today. Labs were reviewed and overall stable. BP is normal at 106/72 today. CBC normal. Creatinine is normal. LFTs are stable, alkaline phosphatase is mildly elevated at 161. Adequate to proceed with treatment today. - Has had some weight loss. Attributes this to lack of appetite secondary to depression. Currently follows with behavioral health. - Per trial protocol imaging to be performed after completion of cycle 6 of treatment which was reviewed with patient and she was comfortable with this plan. 2. History of hypothyroidism: On levothyroxine. TSH was normal at the last check. 3. Gastroesophageal reflux: Currently well controlled with omeprazole and Carafate. 4. History of depression: Follows with behavioral health. All the patient's questions were answered, I believe, to her satisfaction.       FOLLOW UP PLAN:  - Treatment today  - Lab ( cbc, cmp, TSH), MD, treatment ( nivolumab) in 4 weeks     Sylvia Wang MD    This chart was documented by Russell Moreno, acting as a scribe for Sylvia Wang MD. 2/16/2021, 2:11 PM.   Jose Carlos Johnson MD attest that I performed all of the work during this encounter and that the documentation recorded by the scribe accurately and completely reflects the service(s) I personally performed and the decisions made by me.

## 2024-05-20 NOTE — ASSESSMENT
[FreeTextEntry1] : This patient has overt thyrotoxicosis with a history of CAD and SVT. I ordered labs and a thyroid US to determine the etiology of her thyrotoxicosis. Treatment depends on the etiology.  Plan: 1. Labs to be done today - see below 2. Thyroid US 3. Follow up on 05/31/2024 to review results - patient prefers in person visit

## 2024-05-21 ENCOUNTER — NON-APPOINTMENT (OUTPATIENT)
Age: 54
End: 2024-05-21

## 2024-05-21 LAB
ERYTHROCYTE [SEDIMENTATION RATE] IN BLOOD BY WESTERGREN METHOD: 7 MM/HR
T3 SERPL-MCNC: 153 NG/DL
THYROGLOB AB SERPL-ACNC: <20 IU/ML
THYROGLOB SERPL-MCNC: 4.12 NG/ML
THYROPEROXIDASE AB SERPL IA-ACNC: 15.7 IU/ML

## 2024-05-22 LAB — APO LP(A) SERPL-MCNC: 92.4 NMOL/L

## 2024-05-23 LAB
TSH RECEPTOR AB: 2.02 IU/L
TSI ACT/NOR SER: 0.87 IU/L

## 2024-05-24 ENCOUNTER — APPOINTMENT (OUTPATIENT)
Dept: ULTRASOUND IMAGING | Facility: CLINIC | Age: 54
End: 2024-05-24
Payer: COMMERCIAL

## 2024-05-24 ENCOUNTER — OUTPATIENT (OUTPATIENT)
Dept: OUTPATIENT SERVICES | Facility: HOSPITAL | Age: 54
LOS: 1 days | End: 2024-05-24
Payer: COMMERCIAL

## 2024-05-24 DIAGNOSIS — E05.90 THYROTOXICOSIS, UNSPECIFIED WITHOUT THYROTOXIC CRISIS OR STORM: ICD-10-CM

## 2024-05-24 PROCEDURE — 76536 US EXAM OF HEAD AND NECK: CPT | Mod: 26

## 2024-05-24 PROCEDURE — 76536 US EXAM OF HEAD AND NECK: CPT

## 2024-05-31 ENCOUNTER — APPOINTMENT (OUTPATIENT)
Dept: ENDOCRINOLOGY | Facility: CLINIC | Age: 54
End: 2024-05-31
Payer: COMMERCIAL

## 2024-05-31 VITALS
HEART RATE: 95 BPM | DIASTOLIC BLOOD PRESSURE: 62 MMHG | BODY MASS INDEX: 27.31 KG/M2 | SYSTOLIC BLOOD PRESSURE: 118 MMHG | OXYGEN SATURATION: 98 % | WEIGHT: 160 LBS | TEMPERATURE: 97.3 F | RESPIRATION RATE: 17 BRPM | HEIGHT: 64 IN

## 2024-05-31 PROCEDURE — 99214 OFFICE O/P EST MOD 30 MIN: CPT

## 2024-05-31 NOTE — HISTORY OF PRESENT ILLNESS
[FreeTextEntry1] : Problems: 1. Overt thyrotoxicosis  Overt thyrotoxicosis 1. Patient was diagnosed with overt thyrotoxicosis in May 2024 2. Labs: June 2022 - TSH N Dec 2022 - TSH N 05/20/2024 - TSH < 0.01 (0.27 to 4.2), Free T4 - 1.8 (0.9 to 1.8), Free T3 - 5.15 (2 to 4.4), total T3 - 153 N, TPO antibodies neg, thyroglobulin antibodies < 20, TSH-R abs 2.02 (0-1.75), TSI 0.87 (0-0.55), thyroglobulin 4.12 N, ESR 7 (0-20), WBC N, Hb N, Plt N, Cr N, AST N, ALT N 3. Mother had hypothyroidism due to Hashimoto's thyroiditis, no family history of thyroid cancer, no personal history of radiation treatment 4. Cardiac disorders - Had MI in past (has CAD), has SVT 5. Symptoms on 05/20/2024 - no weight loss, had palpitations, no hand tremor, no diaphoresis, has insomnia 6. No Graves' orbitopathy or dermopathy  7. Meds: Patient is not on estrogen Metoprolol ER 25 mg po daily - this is prescribed by Cardiology for her SVT.

## 2024-05-31 NOTE — ASSESSMENT
[FreeTextEntry1] : In May 2024, the patient was found to have overt thyrotoxicosis with normal TPO and thyroglobulin antibodies and mildly positive TSI and TSH-R antibodies. Since the TSI and TSH-R abs were only mildly positive, then Graves' disease could not be definitely diagnosed so I ordered a thyroid uptake and scan to determine the etiology of her thyrotoxicosis. Treatment depends on the etiology. Thyroid US was done 05/24/2024 and the report is pending   The patient has a history of CAD and SVT.   Plan: 1. Thyroid uptake and scan 2. Thyroid US was done 05/24/2024 and the report is pending 3. No labs ordered 4. Follow up 1 week after thyroid uptake and scan done to review results - patient prefers in person visit.

## 2024-06-12 ENCOUNTER — APPOINTMENT (OUTPATIENT)
Dept: ELECTROPHYSIOLOGY | Facility: CLINIC | Age: 54
End: 2024-06-12
Payer: COMMERCIAL

## 2024-06-12 VITALS
OXYGEN SATURATION: 97 % | WEIGHT: 150 LBS | BODY MASS INDEX: 25.61 KG/M2 | HEART RATE: 80 BPM | HEIGHT: 64 IN | DIASTOLIC BLOOD PRESSURE: 77 MMHG | SYSTOLIC BLOOD PRESSURE: 112 MMHG

## 2024-06-12 DIAGNOSIS — I47.10 SUPRAVENTRICULAR TACHYCARDIA, UNSPECIFIED: ICD-10-CM

## 2024-06-12 PROCEDURE — 99204 OFFICE O/P NEW MOD 45 MIN: CPT | Mod: 25

## 2024-06-12 PROCEDURE — 93000 ELECTROCARDIOGRAM COMPLETE: CPT

## 2024-06-12 RX ORDER — UBIDECARENONE 200 MG
200 CAPSULE ORAL
Qty: 100 | Refills: 4 | Status: DISCONTINUED | COMMUNITY
Start: 2022-08-26 | End: 2024-06-12

## 2024-06-17 ENCOUNTER — RESULT REVIEW (OUTPATIENT)
Age: 54
End: 2024-06-17

## 2024-06-17 ENCOUNTER — APPOINTMENT (OUTPATIENT)
Dept: NUCLEAR MEDICINE | Facility: HOSPITAL | Age: 54
End: 2024-06-17
Payer: COMMERCIAL

## 2024-06-17 ENCOUNTER — OUTPATIENT (OUTPATIENT)
Dept: OUTPATIENT SERVICES | Facility: HOSPITAL | Age: 54
LOS: 1 days | End: 2024-06-17
Payer: COMMERCIAL

## 2024-06-17 DIAGNOSIS — E05.90 THYROTOXICOSIS, UNSPECIFIED WITHOUT THYROTOXIC CRISIS OR STORM: ICD-10-CM

## 2024-06-18 ENCOUNTER — APPOINTMENT (OUTPATIENT)
Dept: NUCLEAR MEDICINE | Facility: HOSPITAL | Age: 54
End: 2024-06-18

## 2024-06-18 PROBLEM — I47.10 SVT (SUPRAVENTRICULAR TACHYCARDIA): Status: ACTIVE | Noted: 2024-04-11

## 2024-06-18 PROCEDURE — 78014 THYROID IMAGING W/BLOOD FLOW: CPT | Mod: 26

## 2024-06-18 PROCEDURE — A9516: CPT

## 2024-06-18 PROCEDURE — 78014 THYROID IMAGING W/BLOOD FLOW: CPT

## 2024-06-18 NOTE — REASON FOR VISIT
[Symptom and Test Evaluation] : symptom and test evaluation [FreeTextEntry1] : Benita Rainey is a 54 year old woman who is referred for management of SVT. In 2022 she presented to [facility] with chest pain and elevated troponins. LHC at that time revealed non obstructive mild CAD with a presumptive diagnosis of vasospasm vs takotsubos although her ejection fraction was preserved. She was doing well since december when she began developing episodic palpitations, no clear trigger, rapid onset and offset, typically lasting 1 to 10 minutes, without associated symptoms of shortness of breath, diaphoresis, and chest pain. A monitor was obtained revealing a 10 minute episode of a narrow complex tachycardia appearing consistent with SVT. During laboratory assessment for underlying causes, the patient was noted to be thyrotoxic with TSH < .01 for which she is now following with endocrine and planned for a nuclear uptake study.   Her EKG today reveals normal sinus rhythm. Her heart rate is 80 bpm and blood pressure 112/77.   TTE 2022 Conclusions: 1. Calcified trileaflet aortic valve with decreased opening. 2. Normal left ventricular internal dimensions and wall thicknesses. 3. Normal left ventricular systolic function. No segmental wall motion abnormalities. Paradoxical septal wall motion. 4. Normal right ventricular size and function.

## 2024-06-18 NOTE — DISCUSSION/SUMMARY
[FreeTextEntry1] : Benita Rainey is a 54 year old woman who is referred for management of SVT noted on outpatient monitor associated with palpitations since December. Her work up has been revealing for thyrotoxicosis. The pathophysiology of this patient's condition was discussed with her in great detail including treatment options, risks, and benefits. This patient's arrhythmia may be driven by her uncontrolled thyroid disease and as such she is not currently a candidate for ablation. She will continue metoprolol for now which can be increased if needed. Once her thyroid is adequately addressed, she will follow up for symptom assessment and if her palpitations are persistent we can consider ablation at that time. The plan was discussed with the patient in great detail who verbalized her understanding. The patients questions were all answered to her satisfaction.  [EKG obtained to assist in diagnosis and management of assessed problem(s)] : EKG obtained to assist in diagnosis and management of assessed problem(s)

## 2024-06-18 NOTE — END OF VISIT
[FreeTextEntry3] : I, Dr. Konstantin Tate, personally performed the evaluation and management (E/M) services for this new patient.  That E/M includes conducting the initial examination, assessing all conditions, and establishing the plan of care.  Today, my fellow. Dr. Rios Cerrato, was here to observe my evaluation and management services for this patient to be followed going forward.  [Time Spent: ___ minutes] : I have spent [unfilled] minutes of time on the encounter.

## 2024-06-26 ENCOUNTER — APPOINTMENT (OUTPATIENT)
Dept: ENDOCRINOLOGY | Facility: CLINIC | Age: 54
End: 2024-06-26
Payer: COMMERCIAL

## 2024-06-26 VITALS
RESPIRATION RATE: 18 BRPM | HEIGHT: 64 IN | BODY MASS INDEX: 26.98 KG/M2 | OXYGEN SATURATION: 97 % | HEART RATE: 68 BPM | WEIGHT: 158 LBS | TEMPERATURE: 97.6 F | DIASTOLIC BLOOD PRESSURE: 68 MMHG | SYSTOLIC BLOOD PRESSURE: 124 MMHG

## 2024-06-26 DIAGNOSIS — R76.8 OTHER SPECIFIED ABNORMAL IMMUNOLOGICAL FINDINGS IN SERUM: ICD-10-CM

## 2024-06-26 DIAGNOSIS — E05.90 THYROTOXICOSIS, UNSPECIFIED W/OUT THYROTOXIC CRISIS OR STORM: ICD-10-CM

## 2024-06-26 DIAGNOSIS — E05.00 THYROTOXICOSIS WITH DIFFUSE GOITER W/OUT THYROTOXIC CRISIS OR STORM: ICD-10-CM

## 2024-06-26 PROCEDURE — 99214 OFFICE O/P EST MOD 30 MIN: CPT

## 2024-06-26 RX ORDER — METHIMAZOLE 5 MG/1
5 TABLET ORAL DAILY
Qty: 90 | Refills: 1 | Status: ACTIVE | COMMUNITY
Start: 2024-06-26 | End: 1900-01-01

## 2024-07-12 ENCOUNTER — RX RENEWAL (OUTPATIENT)
Age: 54
End: 2024-07-12

## 2024-08-01 ENCOUNTER — APPOINTMENT (OUTPATIENT)
Dept: CARDIOLOGY | Facility: CLINIC | Age: 54
End: 2024-08-01
Payer: COMMERCIAL

## 2024-08-01 VITALS
OXYGEN SATURATION: 97 % | WEIGHT: 162 LBS | HEART RATE: 70 BPM | BODY MASS INDEX: 27.66 KG/M2 | DIASTOLIC BLOOD PRESSURE: 75 MMHG | SYSTOLIC BLOOD PRESSURE: 111 MMHG | HEIGHT: 64 IN

## 2024-08-01 DIAGNOSIS — I47.10 SUPRAVENTRICULAR TACHYCARDIA, UNSPECIFIED: ICD-10-CM

## 2024-08-01 DIAGNOSIS — I21.4 NON-ST ELEVATION (NSTEMI) MYOCARDIAL INFARCTION: ICD-10-CM

## 2024-08-01 DIAGNOSIS — E78.5 HYPERLIPIDEMIA, UNSPECIFIED: ICD-10-CM

## 2024-08-01 PROCEDURE — 93000 ELECTROCARDIOGRAM COMPLETE: CPT

## 2024-08-01 PROCEDURE — G2211 COMPLEX E/M VISIT ADD ON: CPT | Mod: NC

## 2024-08-01 PROCEDURE — 99214 OFFICE O/P EST MOD 30 MIN: CPT | Mod: 25

## 2024-08-01 NOTE — HISTORY OF PRESENT ILLNESS
[FreeTextEntry1] : History of  non-Q wave MI.  Transferred to [facility].  Cardiac cath with plaque in the RCA, no thrombus.  No stent placed.  Has had further work-up including MRI showing inferior wall abnormality normal wall motion.  Seen today for follow-up of palpitations arrhythmia.  Had cardiac monitor showing long episode of SVT with rapid rates.  She had EP consultation with Dr. Tate.  Seen Dr. Hansen, being treated for hyperthyroidism.  Feels no different at this time

## 2024-08-01 NOTE — DISCUSSION/SUMMARY
[Patient] : the patient [Risks] : risks [Benefits] : benefits [Alternatives] : alternatives [___ Month(s)] : in [unfilled] month(s) [FreeTextEntry1] : Will maintain her current cardiac regimen and treatment for her thyroid.  Questions addressed with her.  Office follow-up approximately 4 months [EKG obtained to assist in diagnosis and management of assessed problem(s)] : EKG obtained to assist in diagnosis and management of assessed problem(s)

## 2024-08-01 NOTE — CARDIOLOGY SUMMARY
[de-identified] : August 26, 2022 sinus rhythm nonspecific ST-T change November 28, 2022 February 27, 2023 sinus rhythm August 1, 2024 sinus rhythm  March 18, 2024 sinus rhythm June 19, 2023 sinus rhythm [de-identified] : April 2020 for SVT with RVR [de-identified] : 2022 [de-identified] : 2022 MRI small area of subendocardial enhancement inferior wall mid ventricular level [de-identified] : [place] 10, 2022 RCA plaque otherwise normal coronary arteriography

## 2024-08-01 NOTE — ASSESSMENT
[FreeTextEntry1] : Status post non-Q infarction with new normal coronary arteriography normal LV function. ([facility]OC[facility]) SVT not currently symptomatic.  Now being treated for hyperthyroidism.

## 2024-08-12 ENCOUNTER — NON-APPOINTMENT (OUTPATIENT)
Age: 54
End: 2024-08-12

## 2024-08-28 ENCOUNTER — APPOINTMENT (OUTPATIENT)
Dept: ENDOCRINOLOGY | Facility: CLINIC | Age: 54
End: 2024-08-28

## 2024-10-10 ENCOUNTER — APPOINTMENT (OUTPATIENT)
Dept: CARDIOLOGY | Facility: CLINIC | Age: 54
End: 2024-10-10

## 2024-12-02 ENCOUNTER — APPOINTMENT (OUTPATIENT)
Dept: CARDIOLOGY | Facility: CLINIC | Age: 54
End: 2024-12-02

## 2025-03-12 NOTE — ED ADULT TRIAGE NOTE - ESI TRIAGE ACUITY LEVEL, MLM
"Kristina is a 49 year old who is being evaluated via a billable video visit.          Assessment & Plan     Acute non-recurrent sinusitis, unspecified location    - amoxicillin (AMOXIL) 500 MG capsule; Take 1 capsule (500 mg) by mouth 3 times daily for 5 days.          BMI  Estimated body mass index is 32.4 kg/m  as calculated from the following:    Height as of 10/2/24: 1.619 m (5' 3.74\").    Weight as of 10/2/24: 84.9 kg (187 lb 3.2 oz).         Return in about 2 weeks (around 3/25/2025), or if symptoms worsen or fail to improve.    Subjective   Kristina is a 49 year old, presenting for the following health issues:  No chief complaint on file.    HPI    Presents with sinus pain, pressure, bilateral ear fullness for over a week.  Symptoms started with sneezing and nasal drainage and have not improved.  Has been using NyQuil at bedtime.  No fevers.          Review of Systems  Constitutional, HEENT, cardiovascular, pulmonary, gi and gu systems are negative, except as otherwise noted.      Objective           Vitals:  No vitals were obtained today due to virtual visit.    Physical Exam   GENERAL: alert and no distress  RESP: No audible wheeze, cough, or visible cyanosis.    PSYCH: Appropriate affect, tone, and pace of words          Video-Visit Details    Type of service:  Video Visit   Originating Location (pt. Location): Home    Distant Location (provider location):  Off-site  Platform used for Video Visit: Aldo  Signed Electronically by: [facility]    "
2

## 2025-07-18 NOTE — H&P ADULT - NSHPLABSRESULTS_GEN_ALL_CORE
faheem
11.9   9.17  )-----------( 244      ( 09 Jun 2022 22:16 )             35.1       06-09    141  |  107  |  12  ----------------------------<  93  3.3<L>   |  24  |  0.70    Ca    9.1      09 Jun 2022 19:15    TPro  6.7  /  Alb  4.0  /  TBili  0.2  /  DBili  x   /  AST  33  /  ALT  13  /  AlkPhos  46  06-09      CARDIAC MARKERS ( 09 Jun 2022 19:15 )  x     / x     / x     / x     / 20.2 ng/mL        LIVER FUNCTIONS - ( 09 Jun 2022 19:15 )  Alb: 4.0 g/dL / Pro: 6.7 g/dL / ALK PHOS: 46 U/L / ALT: 13 U/L / AST: 33 U/L / GGT: x             PT/INR - ( 09 Jun 2022 22:16 )   PT: 14.2 sec;   INR: 1.22 ratio         PTT - ( 09 Jun 2022 22:16 )  PTT:64.3 sec        I have personally reviewed EKG, NSR at 73bpm  I have personally reviewed imaging, CXR with clear lung fields